# Patient Record
Sex: MALE | Race: WHITE | Employment: FULL TIME | URBAN - METROPOLITAN AREA
[De-identification: names, ages, dates, MRNs, and addresses within clinical notes are randomized per-mention and may not be internally consistent; named-entity substitution may affect disease eponyms.]

---

## 2017-10-27 ENCOUNTER — OFFICE VISIT (OUTPATIENT)
Dept: FAMILY MEDICINE CLINIC | Age: 52
End: 2017-10-27
Payer: COMMERCIAL

## 2017-10-27 VITALS
HEART RATE: 70 BPM | SYSTOLIC BLOOD PRESSURE: 110 MMHG | WEIGHT: 231.4 LBS | RESPIRATION RATE: 14 BRPM | DIASTOLIC BLOOD PRESSURE: 80 MMHG

## 2017-10-27 DIAGNOSIS — Z13.220 LIPID SCREENING: ICD-10-CM

## 2017-10-27 DIAGNOSIS — R10.32 LEFT INGUINAL PAIN: ICD-10-CM

## 2017-10-27 DIAGNOSIS — Z87.19 S/P BILATERAL INGUINAL HERNIORRHAPHY: ICD-10-CM

## 2017-10-27 DIAGNOSIS — R22.2 MASS ON BACK: Primary | ICD-10-CM

## 2017-10-27 DIAGNOSIS — Z12.5 SCREENING PSA (PROSTATE SPECIFIC ANTIGEN): ICD-10-CM

## 2017-10-27 DIAGNOSIS — G89.29 CHRONIC HEEL PAIN, LEFT: ICD-10-CM

## 2017-10-27 DIAGNOSIS — M79.672 CHRONIC HEEL PAIN, LEFT: ICD-10-CM

## 2017-10-27 DIAGNOSIS — Z13.1 DIABETES MELLITUS SCREENING: ICD-10-CM

## 2017-10-27 DIAGNOSIS — Z98.890 S/P BILATERAL INGUINAL HERNIORRHAPHY: ICD-10-CM

## 2017-10-27 PROCEDURE — G8421 BMI NOT CALCULATED: HCPCS | Performed by: FAMILY MEDICINE

## 2017-10-27 PROCEDURE — 3017F COLORECTAL CA SCREEN DOC REV: CPT | Performed by: FAMILY MEDICINE

## 2017-10-27 PROCEDURE — G8484 FLU IMMUNIZE NO ADMIN: HCPCS | Performed by: FAMILY MEDICINE

## 2017-10-27 PROCEDURE — 1036F TOBACCO NON-USER: CPT | Performed by: FAMILY MEDICINE

## 2017-10-27 PROCEDURE — G8427 DOCREV CUR MEDS BY ELIG CLIN: HCPCS | Performed by: FAMILY MEDICINE

## 2017-10-27 PROCEDURE — 99203 OFFICE O/P NEW LOW 30 MIN: CPT | Performed by: FAMILY MEDICINE

## 2017-10-27 RX ORDER — NAPROXEN 500 MG/1
500 TABLET ORAL 2 TIMES DAILY PRN
Qty: 20 TABLET | Refills: 0 | Status: SHIPPED | OUTPATIENT
Start: 2017-10-27 | End: 2018-01-11 | Stop reason: ALTCHOICE

## 2017-10-27 ASSESSMENT — ENCOUNTER SYMPTOMS
SHORTNESS OF BREATH: 0
ABDOMINAL PAIN: 0
BLOOD IN STOOL: 0
CHEST TIGHTNESS: 0

## 2017-10-27 ASSESSMENT — PATIENT HEALTH QUESTIONNAIRE - PHQ9
SUM OF ALL RESPONSES TO PHQ9 QUESTIONS 1 & 2: 0
2. FEELING DOWN, DEPRESSED OR HOPELESS: 0
1. LITTLE INTEREST OR PLEASURE IN DOING THINGS: 0
SUM OF ALL RESPONSES TO PHQ QUESTIONS 1-9: 0

## 2017-10-27 NOTE — PROGRESS NOTES
Subjective:      Patient ID: Beatrice Ribeiro is a 45 y.o. male. Visit Information    Have you changed or started any medications since your last visit including any over-the-counter medicines, vitamins, or herbal medicines? no   Are you having any side effects from any of your medications? -  no  Have you stopped taking any of your medications? Is so, why? -  no    Have you seen any other physician or provider since your last visit? No  Have you had any other diagnostic tests since your last visit? No  Have you been seen in the emergency room and/or had an admission to a hospital since we last saw you? No  Have you had your routine dental cleaning in the past 6 months? no    Have you activated your [employer] account? If not, what are your barriers? No     Patient Care Team:  Nikki Frederick MD as PCP - General (Family Medicine)    Medical History Review  Past Medical, Family, and Social History reviewed and does contribute to the patient presenting condition    Health Maintenance   Topic Date Due    Hepatitis C screen  1965    HIV screen  04/30/1978    DTaP/Tdap/Td vaccine (1 - Tdap) 04/30/1982    Lipid screen  04/30/2003    Colon cancer screen colonoscopy  04/30/2013    Flu vaccine (1) 09/01/2017     HPI  Patient is a 77-year-old white male who presents for initial office visit with complaint of masses on right upper back, left heel pain for over 6 months and left inguinal pain. Patient states he had bilateral inguinal herniorrhaphy in 2005. He also states he has a family history of colon cancer and would like to have a screening colonoscopy. He is currently not taking any medication. He denies any chest pain, shortness of breath, fever or chills. Review of Systems   Constitutional: Negative for chills and fever. HENT: Negative for congestion. Respiratory: Negative for chest tightness and shortness of breath. Cardiovascular: Negative for chest pain.    Gastrointestinal: Negative for Glucose, random     Standing Status:   Future     Standing Expiration Date:   10/27/2018    Psa screening     Standing Status:   Future     Standing Expiration Date:   10/27/2018     Orders Placed This Encounter   Medications    naproxen (NAPROSYN) 500 MG tablet     Sig: Take 1 tablet by mouth 2 times daily as needed for Pain (Take with food)     Dispense:  20 tablet     Refill:  0     Patient referred to podiatry for his heel pain  Patient referred to general surgery for excision of masses on back, left inguinal pain, colonoscopy.   Follow-up in 6 months or sooner if needed

## 2017-10-30 ENCOUNTER — HOSPITAL ENCOUNTER (OUTPATIENT)
Age: 52
Discharge: HOME | End: 2017-10-30
Payer: COMMERCIAL

## 2017-10-30 DIAGNOSIS — Z13.1 DIABETES MELLITUS SCREENING: ICD-10-CM

## 2017-10-30 DIAGNOSIS — Z12.5 SCREENING PSA (PROSTATE SPECIFIC ANTIGEN): ICD-10-CM

## 2017-10-30 DIAGNOSIS — E78.5 HYPERLIPIDEMIA, UNSPECIFIED HYPERLIPIDEMIA TYPE: Primary | ICD-10-CM

## 2017-10-30 DIAGNOSIS — Z13.220 LIPID SCREENING: ICD-10-CM

## 2017-10-30 LAB
CHOLESTEROL/HDL RATIO: 5.2
CHOLESTEROL: 256 MG/DL
GLUCOSE BLD-MCNC: 108 MG/DL (ref 70–99)
HDLC SERPL-MCNC: 49 MG/DL
LDL CHOLESTEROL: 190 MG/DL (ref 0–130)
PROSTATE SPECIFIC ANTIGEN: 0.46 UG/L
TRIGL SERPL-MCNC: 87 MG/DL
VLDLC SERPL CALC-MCNC: ABNORMAL MG/DL (ref 1–30)

## 2017-10-30 PROCEDURE — G0103 PSA SCREENING: HCPCS

## 2017-10-30 PROCEDURE — 82947 ASSAY GLUCOSE BLOOD QUANT: CPT

## 2017-10-30 PROCEDURE — 36415 COLL VENOUS BLD VENIPUNCTURE: CPT

## 2017-10-30 PROCEDURE — 80061 LIPID PANEL: CPT

## 2017-10-30 RX ORDER — ROSUVASTATIN CALCIUM 10 MG/1
10 TABLET, COATED ORAL DAILY
Qty: 30 TABLET | Refills: 3 | Status: SHIPPED | OUTPATIENT
Start: 2017-10-30 | End: 2018-03-12 | Stop reason: SDUPTHER

## 2017-11-24 ENCOUNTER — TELEPHONE (OUTPATIENT)
Dept: FAMILY MEDICINE CLINIC | Age: 52
End: 2017-11-24

## 2017-11-30 ENCOUNTER — HOSPITAL ENCOUNTER (OUTPATIENT)
Dept: ULTRASOUND IMAGING | Age: 52
Discharge: HOME | End: 2017-11-30
Payer: COMMERCIAL

## 2017-11-30 DIAGNOSIS — R19.09 GROIN LUMP: ICD-10-CM

## 2017-11-30 PROCEDURE — 76882 US LMTD JT/FCL EVL NVASC XTR: CPT

## 2017-12-01 ENCOUNTER — ANESTHESIA (OUTPATIENT)
Dept: OPERATING ROOM | Age: 52
End: 2017-12-01
Payer: COMMERCIAL

## 2017-12-01 ENCOUNTER — ANESTHESIA EVENT (OUTPATIENT)
Dept: OPERATING ROOM | Age: 52
End: 2017-12-01
Payer: COMMERCIAL

## 2017-12-01 ENCOUNTER — HOSPITAL ENCOUNTER (OUTPATIENT)
Age: 52
Setting detail: OUTPATIENT SURGERY
Discharge: HOME | End: 2017-12-01
Attending: SURGERY | Admitting: SURGERY
Payer: COMMERCIAL

## 2017-12-01 VITALS
SYSTOLIC BLOOD PRESSURE: 137 MMHG | OXYGEN SATURATION: 97 % | TEMPERATURE: 97 F | WEIGHT: 225 LBS | BODY MASS INDEX: 27.98 KG/M2 | RESPIRATION RATE: 14 BRPM | HEART RATE: 60 BPM | HEIGHT: 75 IN | DIASTOLIC BLOOD PRESSURE: 85 MMHG

## 2017-12-01 VITALS
SYSTOLIC BLOOD PRESSURE: 112 MMHG | OXYGEN SATURATION: 100 % | RESPIRATION RATE: 1 BRPM | DIASTOLIC BLOOD PRESSURE: 71 MMHG

## 2017-12-01 PROCEDURE — 7100000001 HC PACU RECOVERY - ADDTL 15 MIN: Performed by: SURGERY

## 2017-12-01 PROCEDURE — 6360000002 HC RX W HCPCS: Performed by: NURSE ANESTHETIST, CERTIFIED REGISTERED

## 2017-12-01 PROCEDURE — 7100000010 HC PHASE II RECOVERY - FIRST 15 MIN: Performed by: SURGERY

## 2017-12-01 PROCEDURE — 6360000002 HC RX W HCPCS: Performed by: SURGERY

## 2017-12-01 PROCEDURE — 2500000003 HC RX 250 WO HCPCS: Performed by: SURGERY

## 2017-12-01 PROCEDURE — 3600000002 HC SURGERY LEVEL 2 BASE: Performed by: SURGERY

## 2017-12-01 PROCEDURE — 88304 TISSUE EXAM BY PATHOLOGIST: CPT

## 2017-12-01 PROCEDURE — 2500000003 HC RX 250 WO HCPCS: Performed by: NURSE ANESTHETIST, CERTIFIED REGISTERED

## 2017-12-01 PROCEDURE — 6360000002 HC RX W HCPCS: Performed by: ANESTHESIOLOGY

## 2017-12-01 PROCEDURE — 7100000011 HC PHASE II RECOVERY - ADDTL 15 MIN: Performed by: SURGERY

## 2017-12-01 PROCEDURE — 2580000003 HC RX 258: Performed by: SURGERY

## 2017-12-01 PROCEDURE — 3700000000 HC ANESTHESIA ATTENDED CARE: Performed by: SURGERY

## 2017-12-01 PROCEDURE — 7100000000 HC PACU RECOVERY - FIRST 15 MIN: Performed by: SURGERY

## 2017-12-01 PROCEDURE — A6258 TRANSPARENT FILM >16<=48 IN: HCPCS | Performed by: SURGERY

## 2017-12-01 PROCEDURE — 3600000012 HC SURGERY LEVEL 2 ADDTL 15MIN: Performed by: SURGERY

## 2017-12-01 PROCEDURE — 3700000001 HC ADD 15 MINUTES (ANESTHESIA): Performed by: SURGERY

## 2017-12-01 PROCEDURE — 2580000003 HC RX 258: Performed by: NURSE ANESTHETIST, CERTIFIED REGISTERED

## 2017-12-01 PROCEDURE — 7100000031 HC ASPR PHASE II RECOVERY - ADDTL 15 MIN: Performed by: SURGERY

## 2017-12-01 PROCEDURE — 7100000030 HC ASPR PHASE II RECOVERY - FIRST 15 MIN: Performed by: SURGERY

## 2017-12-01 PROCEDURE — A6252 ABSORPT DRG >16 <=48 W/O BDR: HCPCS | Performed by: SURGERY

## 2017-12-01 RX ORDER — FENTANYL CITRATE 50 UG/ML
INJECTION, SOLUTION INTRAMUSCULAR; INTRAVENOUS PRN
Status: DISCONTINUED | OUTPATIENT
Start: 2017-12-01 | End: 2017-12-01 | Stop reason: SDUPTHER

## 2017-12-01 RX ORDER — PROPOFOL 10 MG/ML
INJECTION, EMULSION INTRAVENOUS PRN
Status: DISCONTINUED | OUTPATIENT
Start: 2017-12-01 | End: 2017-12-01 | Stop reason: SDUPTHER

## 2017-12-01 RX ORDER — ONDANSETRON 4 MG/1
TABLET, FILM COATED ORAL
Qty: 20 TABLET | Refills: 0 | Status: SHIPPED | OUTPATIENT
Start: 2017-12-01 | End: 2018-01-11 | Stop reason: ALTCHOICE

## 2017-12-01 RX ORDER — OXYCODONE HYDROCHLORIDE AND ACETAMINOPHEN 5; 325 MG/1; MG/1
1 TABLET ORAL
Status: DISCONTINUED | OUTPATIENT
Start: 2017-12-01 | End: 2017-12-01 | Stop reason: HOSPADM

## 2017-12-01 RX ORDER — SODIUM CHLORIDE, SODIUM LACTATE, POTASSIUM CHLORIDE, CALCIUM CHLORIDE 600; 310; 30; 20 MG/100ML; MG/100ML; MG/100ML; MG/100ML
INJECTION, SOLUTION INTRAVENOUS CONTINUOUS PRN
Status: DISCONTINUED | OUTPATIENT
Start: 2017-12-01 | End: 2017-12-01 | Stop reason: SDUPTHER

## 2017-12-01 RX ORDER — EPHEDRINE SULFATE 50 MG/ML
INJECTION, SOLUTION INTRAVENOUS PRN
Status: DISCONTINUED | OUTPATIENT
Start: 2017-12-01 | End: 2017-12-01 | Stop reason: SDUPTHER

## 2017-12-01 RX ORDER — ONDANSETRON 2 MG/ML
4 INJECTION INTRAMUSCULAR; INTRAVENOUS
Status: DISCONTINUED | OUTPATIENT
Start: 2017-12-01 | End: 2017-12-01 | Stop reason: HOSPADM

## 2017-12-01 RX ORDER — MIDAZOLAM HYDROCHLORIDE 1 MG/ML
INJECTION INTRAMUSCULAR; INTRAVENOUS PRN
Status: DISCONTINUED | OUTPATIENT
Start: 2017-12-01 | End: 2017-12-01 | Stop reason: SDUPTHER

## 2017-12-01 RX ORDER — CEPHALEXIN 500 MG/1
CAPSULE ORAL
Qty: 21 CAPSULE | Refills: 0 | Status: SHIPPED | OUTPATIENT
Start: 2017-12-01 | End: 2018-01-11 | Stop reason: ALTCHOICE

## 2017-12-01 RX ORDER — SODIUM CHLORIDE, SODIUM LACTATE, POTASSIUM CHLORIDE, CALCIUM CHLORIDE 600; 310; 30; 20 MG/100ML; MG/100ML; MG/100ML; MG/100ML
INJECTION, SOLUTION INTRAVENOUS CONTINUOUS
Status: DISCONTINUED | OUTPATIENT
Start: 2017-12-01 | End: 2017-12-01 | Stop reason: HOSPADM

## 2017-12-01 RX ORDER — DIPHENHYDRAMINE HYDROCHLORIDE 50 MG/ML
12.5 INJECTION INTRAMUSCULAR; INTRAVENOUS
Status: DISCONTINUED | OUTPATIENT
Start: 2017-12-01 | End: 2017-12-01 | Stop reason: HOSPADM

## 2017-12-01 RX ORDER — MORPHINE SULFATE 10 MG/ML
2 INJECTION, SOLUTION INTRAMUSCULAR; INTRAVENOUS EVERY 5 MIN PRN
Status: DISCONTINUED | OUTPATIENT
Start: 2017-12-01 | End: 2017-12-01 | Stop reason: HOSPADM

## 2017-12-01 RX ORDER — OXYCODONE HYDROCHLORIDE AND ACETAMINOPHEN 5; 325 MG/1; MG/1
TABLET ORAL
Qty: 30 TABLET | Refills: 0 | Status: SHIPPED | OUTPATIENT
Start: 2017-12-01 | End: 2018-02-08 | Stop reason: ALTCHOICE

## 2017-12-01 RX ORDER — LIDOCAINE HYDROCHLORIDE 10 MG/ML
INJECTION, SOLUTION EPIDURAL; INFILTRATION; INTRACAUDAL; PERINEURAL PRN
Status: DISCONTINUED | OUTPATIENT
Start: 2017-12-01 | End: 2017-12-01 | Stop reason: SDUPTHER

## 2017-12-01 RX ORDER — BUPIVACAINE HYDROCHLORIDE 5 MG/ML
INJECTION, SOLUTION EPIDURAL; INTRACAUDAL PRN
Status: DISCONTINUED | OUTPATIENT
Start: 2017-12-01 | End: 2017-12-01 | Stop reason: HOSPADM

## 2017-12-01 RX ORDER — HYDROMORPHONE HCL 110MG/55ML
0.5 PATIENT CONTROLLED ANALGESIA SYRINGE INTRAVENOUS EVERY 5 MIN PRN
Status: DISCONTINUED | OUTPATIENT
Start: 2017-12-01 | End: 2017-12-01 | Stop reason: HOSPADM

## 2017-12-01 RX ADMIN — SODIUM CHLORIDE, POTASSIUM CHLORIDE, SODIUM LACTATE AND CALCIUM CHLORIDE: 600; 310; 30; 20 INJECTION, SOLUTION INTRAVENOUS at 12:34

## 2017-12-01 RX ADMIN — LIDOCAINE HYDROCHLORIDE 50 MG: 10 INJECTION, SOLUTION EPIDURAL; INFILTRATION; INTRACAUDAL; PERINEURAL at 14:38

## 2017-12-01 RX ADMIN — FENTANYL CITRATE 50 MCG: 50 INJECTION, SOLUTION INTRAMUSCULAR; INTRAVENOUS at 15:20

## 2017-12-01 RX ADMIN — HYDROMORPHONE HYDROCHLORIDE 0.5 MG: 2 INJECTION, SOLUTION INTRAMUSCULAR; INTRAVENOUS; SUBCUTANEOUS at 16:28

## 2017-12-01 RX ADMIN — PROPOFOL 200 MG: 10 INJECTION, EMULSION INTRAVENOUS at 14:38

## 2017-12-01 RX ADMIN — HYDROMORPHONE HYDROCHLORIDE 0.5 MG: 2 INJECTION, SOLUTION INTRAMUSCULAR; INTRAVENOUS; SUBCUTANEOUS at 16:20

## 2017-12-01 RX ADMIN — FENTANYL CITRATE 50 MCG: 50 INJECTION, SOLUTION INTRAMUSCULAR; INTRAVENOUS at 14:44

## 2017-12-01 RX ADMIN — FENTANYL CITRATE 50 MCG: 50 INJECTION, SOLUTION INTRAMUSCULAR; INTRAVENOUS at 14:38

## 2017-12-01 RX ADMIN — SODIUM CHLORIDE, POTASSIUM CHLORIDE, SODIUM LACTATE AND CALCIUM CHLORIDE: 600; 310; 30; 20 INJECTION, SOLUTION INTRAVENOUS at 14:35

## 2017-12-01 RX ADMIN — FENTANYL CITRATE 50 MCG: 50 INJECTION, SOLUTION INTRAMUSCULAR; INTRAVENOUS at 15:00

## 2017-12-01 RX ADMIN — EPHEDRINE SULFATE 10 MG: 50 INJECTION INTRAMUSCULAR; INTRAVENOUS; SUBCUTANEOUS at 14:52

## 2017-12-01 RX ADMIN — MIDAZOLAM 2 MG: 1 INJECTION INTRAMUSCULAR; INTRAVENOUS at 14:35

## 2017-12-01 RX ADMIN — CEFAZOLIN 2 G: 1 INJECTION, POWDER, FOR SOLUTION INTRAMUSCULAR; INTRAVENOUS at 14:50

## 2017-12-01 ASSESSMENT — PULMONARY FUNCTION TESTS
PIF_VALUE: 14
PIF_VALUE: 2
PIF_VALUE: 16
PIF_VALUE: 14
PIF_VALUE: 13
PIF_VALUE: 15
PIF_VALUE: 1
PIF_VALUE: 12
PIF_VALUE: 12
PIF_VALUE: 9
PIF_VALUE: 14
PIF_VALUE: 12
PIF_VALUE: 18
PIF_VALUE: 16
PIF_VALUE: 12
PIF_VALUE: 13
PIF_VALUE: 13
PIF_VALUE: 25
PIF_VALUE: 14
PIF_VALUE: 1
PIF_VALUE: 16
PIF_VALUE: 18
PIF_VALUE: 21
PIF_VALUE: 13
PIF_VALUE: 12
PIF_VALUE: 4
PIF_VALUE: 14
PIF_VALUE: 13
PIF_VALUE: 1
PIF_VALUE: 2
PIF_VALUE: 12
PIF_VALUE: 16
PIF_VALUE: 14
PIF_VALUE: 15
PIF_VALUE: 13
PIF_VALUE: 14
PIF_VALUE: 16
PIF_VALUE: 14
PIF_VALUE: 1
PIF_VALUE: 13
PIF_VALUE: 14

## 2017-12-01 ASSESSMENT — PAIN SCALES - GENERAL
PAINLEVEL_OUTOF10: 0
PAINLEVEL_OUTOF10: 0
PAINLEVEL_OUTOF10: 5
PAINLEVEL_OUTOF10: 3
PAINLEVEL_OUTOF10: 5

## 2017-12-01 ASSESSMENT — PAIN DESCRIPTION - LOCATION: LOCATION: BACK

## 2017-12-01 ASSESSMENT — ENCOUNTER SYMPTOMS: STRIDOR: 0

## 2017-12-01 ASSESSMENT — PAIN - FUNCTIONAL ASSESSMENT: PAIN_FUNCTIONAL_ASSESSMENT: 0-10

## 2017-12-01 ASSESSMENT — PAIN DESCRIPTION - PAIN TYPE: TYPE: CHRONIC PAIN

## 2017-12-01 NOTE — H&P
28.12 kg/m². GENERAL APPEARANCE:   Nicholas Ivey is 45 y.o.,  male, mildly obese, nourished, conscious, alert. Does not appear to be distress or pain at this time. SKIN:  Warm, dry, no cyanosis or jaundice. 3 cysts on the back, the largest is about 3-4 cm in size and in the right upper back no redness ulceration or rashes seen patient has another cystic lesion about 1 cm in the mid back and a small half centimeter cystic lesion on the left shoulder posteriorly. HEAD:  Normocephalic, atraumatic, no swelling or tenderness. EYES:  Pupils equal, reactive to light. EARS:  No discharge, no marked hearing loss. NOSE:  No rhinorrhea, epistaxis or septal deformity. THROAT:  Not congested. No ulceration bleeding or discharge. NECK:  No stiffness, trachea central.  No palpable masses or L.N.                 CHEST:  Symmetrical and equal on expansion. HEART:  RRR S1 > S2. No audible murmurs or gallops. LUNGS:  Equal on expansion, normal breath sounds. No adventitious sounds. ABDOMEN:  Obese. Soft on palpation. No localized tenderness. No guarding or rigidity. No palpable organomegaly. LYMPHATICS:  No palpable cervical lymphadenopathy. LOCOMOTOR, BACK AND SPINE:  No tenderness or deformities. EXTREMITIES:  Symmetrical, no pedal edema. Lakishas sign negative. No discoloration or ulcerations. NEUROLOGIC:  The patient is conscious, alert, oriented, No apparent focal sensory or motor deficits. PROVISIONAL DIAGNOSES / SURGERY:      Back lesion, Excision.     Patient Active Problem List    Diagnosis Date Noted    Hyperlipidemia 10/30/2017    S/P bilateral inguinal herniorrhaphy 10/27/2017           KISHA SHEPPARD PA-C on 12/1/2017 at 12:33 PM

## 2017-12-01 NOTE — BRIEF OP NOTE
Brief Postoperative Note  ______________________________________________________________    Patient: Abby Shaw  YOB: 1965  MRN: [id number]  Date of Procedure: 12/1/2017    Pre-Op Diagnosis: UPPER BACK LESION  (PAT ON ADMIT OFFICE TO FAX ORDERS)    Post-Op Diagnosis: Same       Procedure(s):  Excision cyst 6*4*2 cm right scapular/ excision cyst upper mid back 4*2.5*1.5 cm    Anesthesia: [unfilled]    Surgeon(s):  Cynthia Jessica MD    Staff:  Scrub Person First: Krystal De Guzman     Estimated Blood Loss: * No values recorded between 12/1/2017  2:33 PM and 12/1/2017  6:10 PM * mL    Complications: None    Specimens:   ID Type Source Tests Collected by Time Destination   A : right scapula Tissue Joint, Shoulder SURGICAL PATHOLOGY Cynthia Jessica MD 12/1/2017 1459    B : upper midline Tissue Back SURGICAL PATHOLOGY Cynthia Jessica MD 12/1/2017 1503        Implants:  * No implants in log *      Drains:      Findings: dictated    Cynthia Jessica MD  Date: 12/1/2017  Time: 3:19 PM

## 2017-12-01 NOTE — ANESTHESIA PRE PROCEDURE
Department of Anesthesiology  Preprocedure Note       Name:  Yanna Bacon   Age:  45 y.o.  :  1965                                          MRN:  [id number]         Date:  2017      Surgeon: Don Gonzalez):  Berkley Carlton MD    Procedure: Procedure(s):  BACK LESION EXCISION UPPER    Medications prior to admission:   Prior to Admission medications    Medication Sig Start Date End Date Taking? Authorizing Provider   rosuvastatin (CRESTOR) 10 MG tablet Take 1 tablet by mouth daily 10/30/17  Yes Juan Mehta MD   naproxen (NAPROSYN) 500 MG tablet Take 1 tablet by mouth 2 times daily as needed for Pain (Take with food) 10/27/17  Yes Juan Mehta MD       Current medications:    Current Facility-Administered Medications   Medication Dose Route Frequency Provider Last Rate Last Dose    lactated ringers infusion   Intravenous Continuous Berkley Carlton  mL/hr at 17 1234      ceFAZolin (ANCEF) 2 g in sterile water 20 mL IV syringe  2 g Intravenous Once Berkley Carlton MD           Allergies:  No Known Allergies    Problem List:    Patient Active Problem List   Diagnosis Code    S/P bilateral inguinal herniorrhaphy Z98.890, Z87.19    Hyperlipidemia E78.5       Past Medical History:  No past medical history on file.     Past Surgical History:        Procedure Laterality Date    ELBOW SURGERY Right     HERNIA REPAIR  2006    SKIN GRAFT      finger    TONSILLECTOMY      VEIN SURGERY Bilateral     legs       Social History:    Social History   Substance Use Topics    Smoking status: Former Smoker    Smokeless tobacco: Never Used    Alcohol use Yes                                Counseling given: Not Answered      Vital Signs (Current):   Vitals:    17 1200 17 1203   BP:  127/79   Pulse:  60   Resp:  15   Temp:  97.5 °F (36.4 °C)   TempSrc: Oral Oral   SpO2:  99%   Weight: 231 lb (104.8 kg) 225 lb (102.1 kg)   Height:  6' 3\" (1.905 m) BP Readings from Last 3 Encounters:   12/01/17 127/79   10/27/17 110/80       NPO Status: Time of last liquid consumption: 1900                        Time of last solid consumption: 1900                        Date of last liquid consumption: 11/30/17                        Date of last solid food consumption: 11/30/17    BMI:   Wt Readings from Last 3 Encounters:   12/01/17 225 lb (102.1 kg)   10/27/17 231 lb 6.4 oz (105 kg)     Body mass index is 28.12 kg/m². CBC: No results found for: WBC, RBC, HGB, HCT, MCV, RDW, PLT    CMP:   Lab Results   Component Value Date    GLUCOSE 108 10/30/2017       POC Tests: No results for input(s): POCGLU, POCNA, POCK, POCCL, POCBUN, POCHEMO, POCHCT in the last 72 hours. Coags: No results found for: PROTIME, INR, APTT    HCG (If Applicable): No results found for: PREGTESTUR, PREGSERUM, HCG, HCGQUANT     ABGs: No results found for: PHART, PO2ART, SXE8YJL, KMN0QXP, BEART, G3MRTYHS     Type & Screen (If Applicable):  No results found for: LABABO, [address]    Anesthesia Evaluation  Patient summary reviewed  Airway: Mallampati: I  TM distance: >3 FB   Neck ROM: full  Mouth opening: > = 3 FB Dental:          Pulmonary:Negative Pulmonary ROS       (-) rhonchi, wheezes, rales and stridor                           Cardiovascular:Negative CV ROS        (-) murmur,  friction rub, carotid bruit and peripheral edema                Neuro/Psych:   Negative Neuro/Psych ROS              GI/Hepatic/Renal: Neg GI/Hepatic/Renal ROS            Endo/Other: Negative Endo/Other ROS                    Abdominal:           Vascular: negative vascular ROS. Anesthesia Plan      general     ASA 1       Induction: intravenous. MIPS: Postoperative opioids intended and Prophylactic antiemetics administered. Anesthetic plan and risks discussed with patient. Plan discussed with CRNA.                   Ryan Jerez MD   12/1/2017

## 2017-12-01 NOTE — ANESTHESIA POSTPROCEDURE EVALUATION
Department of Anesthesiology  Postprocedure Note    Patient: Tre Rogers  MRN: [id number]  YOB: 1965  Date of evaluation: 12/1/2017  Time:  5:24 PM     Procedure Summary     Date:  12/01/17 Room / Location:  [address] / [address]    Anesthesia Start:  1435 Anesthesia Stop:  4640    Procedure:  BACK LESION EXCISION UPPER x2 (N/A ) Diagnosis:  (UPPER BACK LESION  (PAT ON ADMIT OFFICE TO [facility]))    Surgeon:  Jose Roberto Guillaume MD Responsible Provider:  George Jimenes MD    Anesthesia Type:  general ASA Status:  1          Anesthesia Type: general    Taylor Phase I: Taylor Score: 10    Taylor Phase II:      Last vitals: Reviewed and per EMR flowsheets.        Anesthesia Post Evaluation    Patient location during evaluation: PACU  Patient participation: complete - patient participated  Level of consciousness: awake and alert  Airway patency: patent  Nausea & Vomiting: no vomiting  Complications: no  Cardiovascular status: hemodynamically stable  Respiratory status: acceptable  Hydration status: euvolemic

## 2017-12-05 LAB — SURGICAL PATHOLOGY REPORT: NORMAL

## 2017-12-07 ENCOUNTER — OFFICE VISIT (OUTPATIENT)
Dept: PODIATRY | Age: 52
End: 2017-12-07
Payer: COMMERCIAL

## 2017-12-07 VITALS
WEIGHT: 225 LBS | DIASTOLIC BLOOD PRESSURE: 87 MMHG | SYSTOLIC BLOOD PRESSURE: 123 MMHG | BODY MASS INDEX: 27.98 KG/M2 | HEIGHT: 75 IN | HEART RATE: 68 BPM

## 2017-12-07 DIAGNOSIS — M79.672 PAIN IN LEFT FOOT: ICD-10-CM

## 2017-12-07 DIAGNOSIS — M72.2 PLANTAR FASCIITIS OF LEFT FOOT: Primary | ICD-10-CM

## 2017-12-07 PROCEDURE — 1036F TOBACCO NON-USER: CPT | Performed by: PODIATRIST

## 2017-12-07 PROCEDURE — L3020 FOOT LONGITUD/METATARSAL SUP: HCPCS | Performed by: PODIATRIST

## 2017-12-07 PROCEDURE — G8427 DOCREV CUR MEDS BY ELIG CLIN: HCPCS | Performed by: PODIATRIST

## 2017-12-07 PROCEDURE — 99203 OFFICE O/P NEW LOW 30 MIN: CPT | Performed by: PODIATRIST

## 2017-12-07 PROCEDURE — 3017F COLORECTAL CA SCREEN DOC REV: CPT | Performed by: PODIATRIST

## 2017-12-07 PROCEDURE — G8484 FLU IMMUNIZE NO ADMIN: HCPCS | Performed by: PODIATRIST

## 2017-12-07 PROCEDURE — G8419 CALC BMI OUT NRM PARAM NOF/U: HCPCS | Performed by: PODIATRIST

## 2017-12-07 ASSESSMENT — ENCOUNTER SYMPTOMS
SHORTNESS OF BREATH: 0
NAUSEA: 0
BACK PAIN: 0
DIARRHEA: 0
COLOR CHANGE: 0

## 2017-12-07 NOTE — PROGRESS NOTES
Misty Shrestha is a 45 y.o. male who presents to the office today with chief complaint of pain to the left heel. Chief Complaint   Patient presents with    Foot Pain     left foot pain X2 months   Symptoms began about 2 month(s) ago. Patient denies injury to the left foot. Patient states that the pain is when getting out of bed in the morning and after being on his feet all day. Pain is rated 9 out of 10 at it's worst and is described as intermittent. Treatments prior to today's visit include: None. No Known Allergies    No past medical history on file. Prior to Admission medications    Medication Sig Start Date End Date Taking? Authorizing Provider   cephALEXin (KEFLEX) 500 MG capsule 500 mgTake three times daily 12/1/17  Yes Donna Julian MD   rosuvastatin (CRESTOR) 10 MG tablet Take 1 tablet by mouth daily 10/30/17  Yes Seng Ruiz MD   naproxen (NAPROSYN) 500 MG tablet Take 1 tablet by mouth 2 times daily as needed for Pain (Take with food) 10/27/17  Yes Seng Ruiz MD   oxyCODONE-acetaminophen (PERCOCET) 5-325 MG per tablet Take 1-2 tabs every 4 hours as needed for pain. 12/1/17   Donna Julian MD   ondansetron (ZOFRAN) 4 MG tablet Take every six hours as needed 12/1/17   Donna Julian MD       Past Surgical History:   Procedure Laterality Date    ELBOW SURGERY Right     HERNIA REPAIR  2006    AK EXCISION TUMOR SOFT TISSUE BACK/FLANK SUBQ <3CM N/A 12/1/2017    BACK LESION EXCISION UPPER x2 [facility].      finger    TONSILLECTOMY      VEIN SURGERY Bilateral     legs       Family History   Problem Relation Age of Onset    Cancer Mother      colon cancer       Social History   Substance Use Topics    Smoking status: Former Smoker    Smokeless tobacco: Never Used    Alcohol use Yes       Review of Systems   Constitutional: Negative for activity change, appetite change, chills, diaphoresis, fatigue and fever.    Respiratory: Negative for shortness of breath. Cardiovascular: Negative for leg swelling. Gastrointestinal: Negative for diarrhea and nausea. Endocrine: Negative for cold intolerance, heat intolerance and polyuria. Musculoskeletal: Positive for arthralgias. Negative for back pain, gait problem, joint swelling and myalgias. Skin: Negative for color change, pallor, rash and wound. Allergic/Immunologic: Negative for environmental allergies and food allergies. Neurological: Negative for dizziness, weakness, light-headedness and numbness. Hematological: Does not bruise/bleed easily. Psychiatric/Behavioral: Negative for behavioral problems, confusion and self-injury. The patient is not nervous/anxious. Vitals:   Vitals:    12/07/17 1523   BP: 123/87   Pulse: 68       General: AAO x 3 in NAD. Integument: There are no rashes, ulcers, or breaks in the skin noted to the bilateral lower extremities. There is no induration, subcutaneous nodules, or tightening of the skin noted to the bilateral.     Toenails 1-5 of the right foot do not present with thickness, elongation, discoloration, brittleness, subungual debris. Toenails 1-5 of the left foot do not present with thickness, elongation, discoloration, brittleness, subungual debris. Interdigital maceration absent to web spaces 1-4, Bilateral.     There are no preulcerative lesions noted to the right foot. There are no preulcerative lesions noted to the left foot. The skin to the bilateral feet is not thin and shiny. The skin to the bilateral feet is  warm, supple, and dry. Vascular: DP pulse of the right foot is  palpable. DP pulse of the left foot is  palpable. PT pulse of the right foot is  palpable. PT pulse of the left foot is  palpable. CFT is less than 3 secs to the digits of the right foot. CFT is less than 3 secs to the digits of the left foot. There is no edema noted to the bilateral foot or ankle.

## 2017-12-15 ENCOUNTER — HOSPITAL ENCOUNTER (OUTPATIENT)
Age: 52
Setting detail: OUTPATIENT SURGERY
Discharge: HOME | End: 2017-12-15
Attending: SURGERY | Admitting: SURGERY
Payer: COMMERCIAL

## 2017-12-15 VITALS
DIASTOLIC BLOOD PRESSURE: 78 MMHG | RESPIRATION RATE: 14 BRPM | WEIGHT: 225 LBS | BODY MASS INDEX: 27.98 KG/M2 | SYSTOLIC BLOOD PRESSURE: 117 MMHG | TEMPERATURE: 97.7 F | HEIGHT: 75 IN | HEART RATE: 55 BPM | OXYGEN SATURATION: 96 %

## 2017-12-15 DIAGNOSIS — K57.32 DIVERTICULITIS OF LARGE INTESTINE WITHOUT PERFORATION OR ABSCESS WITHOUT BLEEDING: Primary | ICD-10-CM

## 2017-12-15 PROCEDURE — A6252 ABSORPT DRG >16 <=48 W/O BDR: HCPCS | Performed by: SURGERY

## 2017-12-15 PROCEDURE — 3609027000 HC COLONOSCOPY: Performed by: SURGERY

## 2017-12-15 PROCEDURE — 99153 MOD SED SAME PHYS/QHP EA: CPT | Performed by: SURGERY

## 2017-12-15 PROCEDURE — 7100000011 HC PHASE II RECOVERY - ADDTL 15 MIN: Performed by: SURGERY

## 2017-12-15 PROCEDURE — 6360000002 HC RX W HCPCS: Performed by: SURGERY

## 2017-12-15 PROCEDURE — A6258 TRANSPARENT FILM >16<=48 IN: HCPCS | Performed by: SURGERY

## 2017-12-15 PROCEDURE — 7100000010 HC PHASE II RECOVERY - FIRST 15 MIN: Performed by: SURGERY

## 2017-12-15 PROCEDURE — 99152 MOD SED SAME PHYS/QHP 5/>YRS: CPT | Performed by: SURGERY

## 2017-12-15 PROCEDURE — 2580000003 HC RX 258: Performed by: SURGERY

## 2017-12-15 RX ORDER — FENTANYL CITRATE 50 UG/ML
INJECTION, SOLUTION INTRAMUSCULAR; INTRAVENOUS PRN
Status: DISCONTINUED | OUTPATIENT
Start: 2017-12-15 | End: 2017-12-15 | Stop reason: HOSPADM

## 2017-12-15 RX ORDER — SULFAMETHOXAZOLE AND TRIMETHOPRIM 800; 160 MG/1; MG/1
1 TABLET ORAL 2 TIMES DAILY
Qty: 28 TABLET | Refills: 0 | Status: SHIPPED | OUTPATIENT
Start: 2017-12-15 | End: 2017-12-29

## 2017-12-15 RX ORDER — SODIUM CHLORIDE, SODIUM LACTATE, POTASSIUM CHLORIDE, CALCIUM CHLORIDE 600; 310; 30; 20 MG/100ML; MG/100ML; MG/100ML; MG/100ML
INJECTION, SOLUTION INTRAVENOUS CONTINUOUS
Status: DISCONTINUED | OUTPATIENT
Start: 2017-12-15 | End: 2017-12-15 | Stop reason: HOSPADM

## 2017-12-15 RX ORDER — MIDAZOLAM HYDROCHLORIDE 1 MG/ML
INJECTION INTRAMUSCULAR; INTRAVENOUS PRN
Status: DISCONTINUED | OUTPATIENT
Start: 2017-12-15 | End: 2017-12-15 | Stop reason: HOSPADM

## 2017-12-15 RX ADMIN — SODIUM CHLORIDE, POTASSIUM CHLORIDE, SODIUM LACTATE AND CALCIUM CHLORIDE: 600; 310; 30; 20 INJECTION, SOLUTION INTRAVENOUS at 13:30

## 2017-12-15 ASSESSMENT — PAIN - FUNCTIONAL ASSESSMENT: PAIN_FUNCTIONAL_ASSESSMENT: 0-10

## 2017-12-15 ASSESSMENT — PAIN SCALES - GENERAL: PAINLEVEL_OUTOF10: 0

## 2017-12-15 NOTE — OP NOTE
PROCEDURE NOTE    DATE OF PROCEDURE: 12/15/2017    SURGEON: Sherly Contreras MD    ASSISTANT: None    PREOPERATIVE DIAGNOSIS: Left lower quadrant abdominal pain    POSTOPERATIVE DIAGNOSIS: Sigmoid diverticulosis/grade 1 internal hemorrhoid/poor prep    OPERATION: Total colonoscopy to cecum    ANESTHESIA: Moderate Sedation    ESTIMATED BLOOD LOSS: None    COMPLICATIONS: None     SPECIMENS:  Was Not Obtained    HISTORY: The patient is a 45y.o. year old male with history of above preop diagnosis. I recommended colonoscopy with possible biopsy or polypectomy and I explained the risk, benefits, expected outcome, and alternatives to the procedure. Risks included but are not limited to bleeding, infection, respiratory distress, hypotension, and perforation of the colon and possibility of missing a lesion. The patient understands and is in agreement. PROCEDURE: The patient was given IV conscious sedation. The patient's SPO2 remained above 90% throughout the procedure. Digital rectal exam was normal.  The colonoscope was inserted through the anus into the rectum and advanced under direct vision to the cecum without difficulty. Terminal ileum was examined for approximately 2 inches. The prep was poor. yp  Findings:  Terminal ileum: not examined    Cecum/Ascending colon: normal except poor prep limiting visualization    Transverse colon: Grossly normal except poor prep limiting visualization    Descending/Sigmoid colon: abnormal: Sigmoid diverticulosis/poor prep limiting visualization    Rectum/Anus: examined in normal and retroflexed positions and was abnormal: Grade 1 internal hemorrhoid/stool in the rectal wall limiting visualization    Withdrawal Time was (minutes): 14      Next screening colonoscopy: 1 years. If screening is less than 10 years the recommended reason is due:poor preparation    The colon was decompressed and the scope was removed. The patient tolerated the procedure well. Recommendations/Plan:   1. F/U Biopsies  2. F/U In Office as instructed  3. Discussed with the family  4. High fiber diet   5. Precautions to avoid constipation  6. Given patient's history of left lower quadrant pain I will schedule him for CT scan of the abdomen and pelvis with contrast.  7. His incisions on the back where examined. Every other staple was removed. Mild erythema noted on one of the incision and we'll place the patient on a biotics for 10 days. 8. Outpatient follow-up in the office to discuss colonoscopy findings pathology results on the cyst excised on the back and wound evaluation in the next few days.   9. Outpatient CT scan of the abdomen and pelvis as mentioned above regarding left lower quadrant abdominal pain    Electronically signed by Sherly Contreras MD  on 12/15/2017 at 3:31 PM

## 2017-12-15 NOTE — H&P
HISTORY and [facility] [address]       NAME:  Clint Lei  MRN: [id number]   YOB: 1965   Date: 12/15/2017   Age: 45 y.o. Gender: male       COMPLAINT AND PRESENT HISTORY:   45 y o male with slight abdominal discomfort in left lower quadrant. No particular trigger. Discomfort relatively constant at 2 on scale. Denies constipation, diarrhea or black/bloody stool. Mother had colon cancer. Duration couple months. PAST MEDICAL HISTORY   History reviewed. No pertinent past medical history. Pt denies any history of Diabetes mellitus type 2, hypertension, stroke, heart disease, COPD, Asthma, GERD, HLD, Cancer, Seizures,Thyroid disease, Kidney Disease, Hepatitis, TB, Psychiatric Disorders or Substance abuse. SURGICAL HISTORY       Past Surgical History:   Procedure Laterality Date    ELBOW SURGERY Right     HERNIA REPAIR  2006    AL EXCISION TUMOR SOFT TISSUE BACK/FLANK SUBQ <3CM N/A 12/1/2017    BACK LESION EXCISION UPPER x2 performed [facility] Blanco [facility]cock [facility].      finger    TONSILLECTOMY      VEIN SURGERY Bilateral     legs       FAMILY HISTORY       Family History   Problem Relation Age of Onset    Cancer Mother      colon cancer       SOCIAL HISTORY       Social History     Social History    Marital status:      Spouse name: N/A    Number of children: N/A    Years of education: N/A     Social History Main Topics    Smoking status: Former Smoker    Smokeless tobacco: Never Used    Alcohol use Yes    Drug use: Unknown    Sexual activity: Not Asked     Other Topics Concern    None     Social History Narrative    None           REVIEW OF SYSTEMS      No Known Allergies    No current facility-administered medications on file prior to encounter.       Current Outpatient Prescriptions on File Prior to Encounter   Medication Sig Dispense Refill    oxyCODONE-acetaminophen (PERCOCET) 5-325 MG per tablet Take 1-2 tabs every 4 hours as needed for pain. 30 tablet 0    cephALEXin (KEFLEX) 500 MG capsule 500 mgTake three times daily 21 capsule 0    rosuvastatin (CRESTOR) 10 MG tablet Take 1 tablet by mouth daily 30 tablet 3    naproxen (NAPROSYN) 500 MG tablet Take 1 tablet by mouth 2 times daily as needed for Pain (Take with food) 20 tablet 0    ondansetron (ZOFRAN) 4 MG tablet Take every six hours as needed 20 tablet 0        General health:  Fair. No fever or chills. Skin:  No itching, redness or rash. HEENT:  No epistaxis or sore throat. Couple headaches. Neck:  No pain, stiffness or masses. Cardiovascular/Respiratory system:  No chest pain, palpitation or shortness of breath. Gastrointestinal tract: No abdominal pain, nausea, vomiting, diarrhea or constipation. Genitourinary:  No burning on micturition. No hesitancy, urgency, frequency or discoloration of urine. Locomotor:  No bone or joint pains. No swelling. Muscle/joint pain R/T work in construction. Neuropsychiatric:  No referable complaints. See HPI. GENERAL PHYSICAL EXAM:     Vitals: /72   Pulse 61   Temp 97.5 °F (36.4 °C) (Oral)   Resp 16   Ht 6' 3\" (1.905 m)   Wt 225 lb (102.1 kg)   SpO2 99%   BMI 28.12 kg/m²  Body mass index is 28.12 kg/m². GENERAL APPEARANCE:   Zuly Kemp is 45 y.o.,  male, not obese, nourished, conscious, alert. Does not appear to be distress or pain at this time. SKIN:  Warm, dry, no cyanosis or jaundice. HEAD:  Normocephalic, atraumatic, no swelling or tenderness. EYES:  Pupils equal, reactive to light. EARS:  No discharge, no marked hearing loss. NOSE:  No rhinorrhea, epistaxis or septal deformity. THROAT:  Not congested. No ulceration bleeding or discharge.                   NECK:  No

## 2017-12-22 ENCOUNTER — HOSPITAL ENCOUNTER (OUTPATIENT)
Dept: CT IMAGING | Age: 52
Discharge: HOME | End: 2017-12-22
Payer: COMMERCIAL

## 2017-12-22 DIAGNOSIS — K57.32 DIVERTICULITIS OF LARGE INTESTINE WITHOUT PERFORATION OR ABSCESS WITHOUT BLEEDING: ICD-10-CM

## 2017-12-22 PROCEDURE — 74177 CT ABD & PELVIS W/CONTRAST: CPT

## 2017-12-22 PROCEDURE — 2580000003 HC RX 258: Performed by: SURGERY

## 2017-12-22 PROCEDURE — 6360000004 HC RX CONTRAST MEDICATION: Performed by: SURGERY

## 2017-12-22 RX ORDER — 0.9 % SODIUM CHLORIDE 0.9 %
100 INTRAVENOUS SOLUTION INTRAVENOUS ONCE
Status: COMPLETED | OUTPATIENT
Start: 2017-12-22 | End: 2017-12-22

## 2017-12-22 RX ORDER — SODIUM CHLORIDE 0.9 % (FLUSH) 0.9 %
10 SYRINGE (ML) INJECTION PRN
Status: DISCONTINUED | OUTPATIENT
Start: 2017-12-22 | End: 2017-12-25 | Stop reason: HOSPADM

## 2017-12-22 RX ADMIN — IOHEXOL 50 ML: 240 INJECTION, SOLUTION INTRATHECAL; INTRAVASCULAR; INTRAVENOUS; ORAL at 09:50

## 2017-12-22 RX ADMIN — Medication 10 ML: at 09:50

## 2017-12-22 RX ADMIN — SODIUM CHLORIDE 100 ML: 9 INJECTION, SOLUTION INTRAVENOUS at 09:50

## 2017-12-22 RX ADMIN — IOPAMIDOL 100 ML: 755 INJECTION, SOLUTION INTRAVENOUS at 09:50

## 2018-01-10 ENCOUNTER — TELEPHONE (OUTPATIENT)
Dept: PODIATRY | Age: 53
End: 2018-01-10

## 2018-01-11 ENCOUNTER — OFFICE VISIT (OUTPATIENT)
Dept: PODIATRY | Age: 53
End: 2018-01-11
Payer: COMMERCIAL

## 2018-01-11 VITALS
HEIGHT: 75 IN | WEIGHT: 225 LBS | HEART RATE: 77 BPM | BODY MASS INDEX: 27.98 KG/M2 | DIASTOLIC BLOOD PRESSURE: 87 MMHG | SYSTOLIC BLOOD PRESSURE: 144 MMHG

## 2018-01-11 DIAGNOSIS — M79.672 PAIN IN LEFT FOOT: ICD-10-CM

## 2018-01-11 DIAGNOSIS — M72.2 PLANTAR FASCIITIS OF LEFT FOOT: Primary | ICD-10-CM

## 2018-01-11 PROCEDURE — G8484 FLU IMMUNIZE NO ADMIN: HCPCS | Performed by: PODIATRIST

## 2018-01-11 PROCEDURE — 20550 NJX 1 TENDON SHEATH/LIGAMENT: CPT | Performed by: PODIATRIST

## 2018-01-11 PROCEDURE — 3017F COLORECTAL CA SCREEN DOC REV: CPT | Performed by: PODIATRIST

## 2018-01-11 PROCEDURE — G8427 DOCREV CUR MEDS BY ELIG CLIN: HCPCS | Performed by: PODIATRIST

## 2018-01-11 PROCEDURE — 1036F TOBACCO NON-USER: CPT | Performed by: PODIATRIST

## 2018-01-11 PROCEDURE — G8419 CALC BMI OUT NRM PARAM NOF/U: HCPCS | Performed by: PODIATRIST

## 2018-01-11 RX ORDER — BUPIVACAINE HYDROCHLORIDE 5 MG/ML
1 INJECTION, SOLUTION PERINEURAL ONCE
Status: COMPLETED | OUTPATIENT
Start: 2018-01-11 | End: 2018-01-11

## 2018-01-11 RX ADMIN — BUPIVACAINE HYDROCHLORIDE 5 MG: 5 INJECTION, SOLUTION PERINEURAL at 16:29

## 2018-01-11 ASSESSMENT — ENCOUNTER SYMPTOMS
BACK PAIN: 0
NAUSEA: 0
COLOR CHANGE: 0
SHORTNESS OF BREATH: 0
DIARRHEA: 0

## 2018-01-11 NOTE — PROGRESS NOTES
Subjective: Gabbie Licea 45 y.o. male that presents for follow up evaluation of plantar fasciitis to the left foot. Chief Complaint   Patient presents with   [facility]     pt follow up left, still sore     Other     orthotics dispensed with instructions    Patient's treatment thus far has included physical therapy, Power Step inserts, casted for custom molded orthotics, icing and stretching. Pain is rated 8 out of 10 and is described as intermittent. Patient has been following my prescribed course of therapy as instructed. Review of Systems   Constitutional: Negative for activity change, appetite change, chills, diaphoresis, fatigue and fever. Respiratory: Negative for shortness of breath. Cardiovascular: Negative for leg swelling. Gastrointestinal: Negative for diarrhea and nausea. Endocrine: Negative for cold intolerance, heat intolerance and polyuria. Musculoskeletal: Positive for arthralgias. Negative for back pain, gait problem, joint swelling and myalgias. Skin: Negative for color change, pallor, rash and wound. Allergic/Immunologic: Negative for environmental allergies and food allergies. Neurological: Negative for dizziness, weakness, light-headedness and numbness. Hematological: Does not bruise/bleed easily. Psychiatric/Behavioral: Negative for behavioral problems, confusion and self-injury. The patient is not nervous/anxious. Objective: Clinical evaluation of the patient reveals pain with palpation to the plantar medial calcaneal tubercle of the left foot. There is pain with palpation to the plantar central aspect of the left heel. There is no pain with palpation to the blas pedis of the left foot. There is no pain with medial to lateral compression of the left calcaneus. Tinel's sign is negative to the left tarsal tunnel. There is no erythema, calor, or open lesion noted to the left foot or ankle. Assessment:   1.  Plantar fasciitis of left foot  bupivacaine

## 2018-02-01 ENCOUNTER — OFFICE VISIT (OUTPATIENT)
Dept: PODIATRY | Age: 53
End: 2018-02-01
Payer: COMMERCIAL

## 2018-02-01 VITALS
HEART RATE: 66 BPM | BODY MASS INDEX: 28.6 KG/M2 | WEIGHT: 230 LBS | DIASTOLIC BLOOD PRESSURE: 88 MMHG | HEIGHT: 75 IN | SYSTOLIC BLOOD PRESSURE: 130 MMHG

## 2018-02-01 DIAGNOSIS — M79.672 PAIN IN LEFT FOOT: ICD-10-CM

## 2018-02-01 DIAGNOSIS — M72.2 PLANTAR FASCIITIS OF LEFT FOOT: Primary | ICD-10-CM

## 2018-02-01 PROCEDURE — G8484 FLU IMMUNIZE NO ADMIN: HCPCS | Performed by: PODIATRIST

## 2018-02-01 PROCEDURE — 1036F TOBACCO NON-USER: CPT | Performed by: PODIATRIST

## 2018-02-01 PROCEDURE — G8427 DOCREV CUR MEDS BY ELIG CLIN: HCPCS | Performed by: PODIATRIST

## 2018-02-01 PROCEDURE — 3017F COLORECTAL CA SCREEN DOC REV: CPT | Performed by: PODIATRIST

## 2018-02-01 PROCEDURE — G8419 CALC BMI OUT NRM PARAM NOF/U: HCPCS | Performed by: PODIATRIST

## 2018-02-01 PROCEDURE — 99213 OFFICE O/P EST LOW 20 MIN: CPT | Performed by: PODIATRIST

## 2018-02-01 RX ORDER — POLYETHYLENE GLYCOL-3350 AND ELECTROLYTES 236; 6.74; 5.86; 2.97; 22.74 G/274.31G; G/274.31G; G/274.31G; G/274.31G; G/274.31G
POWDER, FOR SOLUTION ORAL
Refills: 0 | COMMUNITY
Start: 2018-01-08

## 2018-02-01 ASSESSMENT — ENCOUNTER SYMPTOMS
COLOR CHANGE: 0
NAUSEA: 0
BACK PAIN: 0
SHORTNESS OF BREATH: 0
DIARRHEA: 0

## 2018-02-01 NOTE — PROGRESS NOTES
fasciitis of left foot     2. Pain in left foot           Plan: 1. Clinical evaluation of the patient. 2.  Patient encouraged to continue with icing and stretching and with orthotics. 3. Return if symptoms worsen or fail to improve.    2/1/2018      Nereyda Grewal DPM

## 2018-02-08 ENCOUNTER — HOSPITAL ENCOUNTER (OUTPATIENT)
Dept: PREADMISSION TESTING | Age: 53
Discharge: HOME | End: 2018-02-12
Payer: COMMERCIAL

## 2018-02-08 VITALS
HEIGHT: 75 IN | RESPIRATION RATE: 16 BRPM | SYSTOLIC BLOOD PRESSURE: 129 MMHG | BODY MASS INDEX: 29.84 KG/M2 | TEMPERATURE: 98.6 F | DIASTOLIC BLOOD PRESSURE: 87 MMHG | OXYGEN SATURATION: 96 % | WEIGHT: 240 LBS | HEART RATE: 66 BPM

## 2018-02-08 LAB
ABSOLUTE EOS #: 0.71 K/UL (ref 0–0.4)
ABSOLUTE IMMATURE GRANULOCYTE: ABNORMAL K/UL (ref 0–0.3)
ABSOLUTE LYMPH #: 2.09 K/UL (ref 1–4.8)
ABSOLUTE MONO #: 0.36 K/UL (ref 0.1–1.3)
ANION GAP SERPL CALCULATED.3IONS-SCNC: 9 MMOL/L (ref 9–17)
BASOPHILS # BLD: 1 % (ref 0–2)
BASOPHILS ABSOLUTE: 0.05 K/UL (ref 0–0.2)
BUN BLDV-MCNC: 13 MG/DL (ref 6–20)
BUN/CREAT BLD: NORMAL (ref 9–20)
CALCIUM SERPL-MCNC: 9.6 MG/DL (ref 8.6–10.4)
CHLORIDE BLD-SCNC: 102 MMOL/L (ref 98–107)
CO2: 29 MMOL/L (ref 20–31)
CREAT SERPL-MCNC: 0.79 MG/DL (ref 0.7–1.2)
DIFFERENTIAL TYPE: ABNORMAL
EOSINOPHILS RELATIVE PERCENT: 14 % (ref 0–4)
GFR AFRICAN AMERICAN: >60 ML/MIN
GFR NON-AFRICAN AMERICAN: >60 ML/MIN
GFR SERPL CREATININE-BSD FRML MDRD: NORMAL ML/MIN/{1.73_M2}
GFR SERPL CREATININE-BSD FRML MDRD: NORMAL ML/MIN/{1.73_M2}
GLUCOSE BLD-MCNC: 90 MG/DL (ref 70–99)
HCT VFR BLD CALC: 36.1 % (ref 41–53)
HEMOGLOBIN: 12.4 G/DL (ref 13.5–17.5)
IMMATURE GRANULOCYTES: ABNORMAL %
LYMPHOCYTES # BLD: 41 % (ref 24–44)
MCH RBC QN AUTO: 31.9 PG (ref 26–34)
MCHC RBC AUTO-ENTMCNC: 34.3 G/DL (ref 31–37)
MCV RBC AUTO: 93 FL (ref 80–100)
MONOCYTES # BLD: 7 % (ref 1–7)
MORPHOLOGY: NORMAL
NRBC AUTOMATED: ABNORMAL PER 100 WBC
PDW BLD-RTO: 13.2 % (ref 11.5–14.9)
PLATELET # BLD: 294 K/UL (ref 150–450)
PLATELET ESTIMATE: ABNORMAL
PMV BLD AUTO: 7.7 FL (ref 6–12)
POTASSIUM SERPL-SCNC: 4.4 MMOL/L (ref 3.7–5.3)
RBC # BLD: 3.88 M/UL (ref 4.5–5.9)
RBC # BLD: ABNORMAL 10*6/UL
SEG NEUTROPHILS: 37 % (ref 36–66)
SEGMENTED NEUTROPHILS ABSOLUTE COUNT: 1.89 K/UL (ref 1.3–9.1)
SODIUM BLD-SCNC: 140 MMOL/L (ref 135–144)
WBC # BLD: 5.1 K/UL (ref 3.5–11)
WBC # BLD: ABNORMAL 10*3/UL

## 2018-02-08 PROCEDURE — 85025 COMPLETE CBC W/AUTO DIFF WBC: CPT

## 2018-02-08 PROCEDURE — 80048 BASIC METABOLIC PNL TOTAL CA: CPT

## 2018-02-09 ENCOUNTER — ANESTHESIA EVENT (OUTPATIENT)
Dept: OPERATING ROOM | Age: 53
End: 2018-02-09
Payer: COMMERCIAL

## 2018-02-09 RX ORDER — SODIUM CHLORIDE 0.9 % (FLUSH) 0.9 %
10 SYRINGE (ML) INJECTION PRN
Status: CANCELLED | OUTPATIENT
Start: 2018-02-09

## 2018-02-09 RX ORDER — SODIUM CHLORIDE, SODIUM LACTATE, POTASSIUM CHLORIDE, CALCIUM CHLORIDE 600; 310; 30; 20 MG/100ML; MG/100ML; MG/100ML; MG/100ML
INJECTION, SOLUTION INTRAVENOUS CONTINUOUS
Status: CANCELLED | OUTPATIENT
Start: 2018-02-09

## 2018-02-09 RX ORDER — LIDOCAINE HYDROCHLORIDE 10 MG/ML
1 INJECTION, SOLUTION EPIDURAL; INFILTRATION; INTRACAUDAL; PERINEURAL
Status: CANCELLED | OUTPATIENT
Start: 2018-02-09 | End: 2018-02-09

## 2018-02-09 RX ORDER — SODIUM CHLORIDE 0.9 % (FLUSH) 0.9 %
10 SYRINGE (ML) INJECTION EVERY 12 HOURS SCHEDULED
Status: CANCELLED | OUTPATIENT
Start: 2018-02-09

## 2018-02-19 ENCOUNTER — ANESTHESIA (OUTPATIENT)
Dept: OPERATING ROOM | Age: 53
End: 2018-02-19
Payer: COMMERCIAL

## 2018-02-19 ENCOUNTER — HOSPITAL ENCOUNTER (OUTPATIENT)
Age: 53
Setting detail: OUTPATIENT SURGERY
Discharge: HOME | End: 2018-02-19
Attending: SURGERY | Admitting: SURGERY
Payer: COMMERCIAL

## 2018-02-19 VITALS
DIASTOLIC BLOOD PRESSURE: 80 MMHG | BODY MASS INDEX: 29.84 KG/M2 | OXYGEN SATURATION: 98 % | HEART RATE: 78 BPM | WEIGHT: 240 LBS | SYSTOLIC BLOOD PRESSURE: 121 MMHG | RESPIRATION RATE: 12 BRPM | HEIGHT: 75 IN | TEMPERATURE: 96.9 F

## 2018-02-19 VITALS — OXYGEN SATURATION: 100 % | TEMPERATURE: 94.6 F | DIASTOLIC BLOOD PRESSURE: 57 MMHG | SYSTOLIC BLOOD PRESSURE: 106 MMHG

## 2018-02-19 DIAGNOSIS — L72.9 CYST OF SKIN: Primary | ICD-10-CM

## 2018-02-19 PROCEDURE — 3700000000 HC ANESTHESIA ATTENDED CARE: Performed by: SURGERY

## 2018-02-19 PROCEDURE — 2580000003 HC RX 258: Performed by: ANESTHESIOLOGY

## 2018-02-19 PROCEDURE — 88305 TISSUE EXAM BY PATHOLOGIST: CPT

## 2018-02-19 PROCEDURE — 3609010300 HC COLONOSCOPY W/BIOPSY SINGLE/MULTIPLE: Performed by: SURGERY

## 2018-02-19 PROCEDURE — 7100000030 HC ASPR PHASE II RECOVERY - FIRST 15 MIN: Performed by: SURGERY

## 2018-02-19 PROCEDURE — 2500000003 HC RX 250 WO HCPCS: Performed by: SURGERY

## 2018-02-19 PROCEDURE — 7100000000 HC PACU RECOVERY - FIRST 15 MIN: Performed by: SURGERY

## 2018-02-19 PROCEDURE — 3700000001 HC ADD 15 MINUTES (ANESTHESIA): Performed by: SURGERY

## 2018-02-19 PROCEDURE — 7100000001 HC PACU RECOVERY - ADDTL 15 MIN: Performed by: SURGERY

## 2018-02-19 PROCEDURE — 6360000002 HC RX W HCPCS: Performed by: NURSE ANESTHETIST, CERTIFIED REGISTERED

## 2018-02-19 PROCEDURE — A6402 STERILE GAUZE <= 16 SQ IN: HCPCS | Performed by: SURGERY

## 2018-02-19 PROCEDURE — 88304 TISSUE EXAM BY PATHOLOGIST: CPT

## 2018-02-19 PROCEDURE — 2580000003 HC RX 258: Performed by: NURSE ANESTHETIST, CERTIFIED REGISTERED

## 2018-02-19 PROCEDURE — 2500000003 HC RX 250 WO HCPCS: Performed by: NURSE ANESTHETIST, CERTIFIED REGISTERED

## 2018-02-19 PROCEDURE — 7100000031 HC ASPR PHASE II RECOVERY - ADDTL 15 MIN: Performed by: SURGERY

## 2018-02-19 RX ORDER — MORPHINE SULFATE 2 MG/ML
2 INJECTION, SOLUTION INTRAMUSCULAR; INTRAVENOUS EVERY 5 MIN PRN
Status: DISCONTINUED | OUTPATIENT
Start: 2018-02-19 | End: 2018-02-19 | Stop reason: HOSPADM

## 2018-02-19 RX ORDER — FENTANYL CITRATE 50 UG/ML
25 INJECTION, SOLUTION INTRAMUSCULAR; INTRAVENOUS EVERY 5 MIN PRN
Status: DISCONTINUED | OUTPATIENT
Start: 2018-02-19 | End: 2018-02-19 | Stop reason: HOSPADM

## 2018-02-19 RX ORDER — ONDANSETRON 2 MG/ML
INJECTION INTRAMUSCULAR; INTRAVENOUS PRN
Status: DISCONTINUED | OUTPATIENT
Start: 2018-02-19 | End: 2018-02-19 | Stop reason: SDUPTHER

## 2018-02-19 RX ORDER — SODIUM CHLORIDE 0.9 % (FLUSH) 0.9 %
10 SYRINGE (ML) INJECTION EVERY 12 HOURS SCHEDULED
Status: DISCONTINUED | OUTPATIENT
Start: 2018-02-19 | End: 2018-02-19 | Stop reason: HOSPADM

## 2018-02-19 RX ORDER — ONDANSETRON 4 MG/1
TABLET, FILM COATED ORAL
Qty: 20 TABLET | Refills: 0 | Status: SHIPPED | OUTPATIENT
Start: 2018-02-19

## 2018-02-19 RX ORDER — EPHEDRINE SULFATE 50 MG/ML
INJECTION, SOLUTION INTRAVENOUS PRN
Status: DISCONTINUED | OUTPATIENT
Start: 2018-02-19 | End: 2018-02-19 | Stop reason: SDUPTHER

## 2018-02-19 RX ORDER — OXYCODONE HYDROCHLORIDE AND ACETAMINOPHEN 5; 325 MG/1; MG/1
1 TABLET ORAL EVERY 6 HOURS PRN
Qty: 28 TABLET | Refills: 0 | Status: SHIPPED | OUTPATIENT
Start: 2018-02-19 | End: 2018-02-26

## 2018-02-19 RX ORDER — HYDRALAZINE HYDROCHLORIDE 20 MG/ML
5 INJECTION INTRAMUSCULAR; INTRAVENOUS EVERY 10 MIN PRN
Status: DISCONTINUED | OUTPATIENT
Start: 2018-02-19 | End: 2018-02-19 | Stop reason: HOSPADM

## 2018-02-19 RX ORDER — PROMETHAZINE HYDROCHLORIDE 25 MG/ML
6.25 INJECTION, SOLUTION INTRAMUSCULAR; INTRAVENOUS
Status: DISCONTINUED | OUTPATIENT
Start: 2018-02-19 | End: 2018-02-19 | Stop reason: HOSPADM

## 2018-02-19 RX ORDER — SODIUM CHLORIDE, SODIUM LACTATE, POTASSIUM CHLORIDE, CALCIUM CHLORIDE 600; 310; 30; 20 MG/100ML; MG/100ML; MG/100ML; MG/100ML
INJECTION, SOLUTION INTRAVENOUS CONTINUOUS PRN
Status: DISCONTINUED | OUTPATIENT
Start: 2018-02-19 | End: 2018-02-19 | Stop reason: SDUPTHER

## 2018-02-19 RX ORDER — SODIUM CHLORIDE, SODIUM LACTATE, POTASSIUM CHLORIDE, CALCIUM CHLORIDE 600; 310; 30; 20 MG/100ML; MG/100ML; MG/100ML; MG/100ML
INJECTION, SOLUTION INTRAVENOUS CONTINUOUS
Status: DISCONTINUED | OUTPATIENT
Start: 2018-02-19 | End: 2018-02-19 | Stop reason: HOSPADM

## 2018-02-19 RX ORDER — FENTANYL CITRATE 50 UG/ML
INJECTION, SOLUTION INTRAMUSCULAR; INTRAVENOUS PRN
Status: DISCONTINUED | OUTPATIENT
Start: 2018-02-19 | End: 2018-02-19 | Stop reason: SDUPTHER

## 2018-02-19 RX ORDER — BUPIVACAINE HYDROCHLORIDE 5 MG/ML
INJECTION, SOLUTION EPIDURAL; INTRACAUDAL PRN
Status: DISCONTINUED | OUTPATIENT
Start: 2018-02-19 | End: 2018-02-19 | Stop reason: HOSPADM

## 2018-02-19 RX ORDER — SODIUM CHLORIDE 0.9 % (FLUSH) 0.9 %
10 SYRINGE (ML) INJECTION PRN
Status: DISCONTINUED | OUTPATIENT
Start: 2018-02-19 | End: 2018-02-19 | Stop reason: HOSPADM

## 2018-02-19 RX ORDER — CEFAZOLIN SODIUM 1 G/3ML
INJECTION, POWDER, FOR SOLUTION INTRAMUSCULAR; INTRAVENOUS PRN
Status: DISCONTINUED | OUTPATIENT
Start: 2018-02-19 | End: 2018-02-19 | Stop reason: SDUPTHER

## 2018-02-19 RX ORDER — LIDOCAINE HYDROCHLORIDE 10 MG/ML
1 INJECTION, SOLUTION EPIDURAL; INFILTRATION; INTRACAUDAL; PERINEURAL
Status: DISCONTINUED | OUTPATIENT
Start: 2018-02-19 | End: 2018-02-19 | Stop reason: HOSPADM

## 2018-02-19 RX ORDER — DIPHENHYDRAMINE HYDROCHLORIDE 50 MG/ML
12.5 INJECTION INTRAMUSCULAR; INTRAVENOUS
Status: DISCONTINUED | OUTPATIENT
Start: 2018-02-19 | End: 2018-02-19 | Stop reason: HOSPADM

## 2018-02-19 RX ORDER — HYDROCODONE BITARTRATE AND ACETAMINOPHEN 5; 325 MG/1; MG/1
1 TABLET ORAL
Status: DISCONTINUED | OUTPATIENT
Start: 2018-02-19 | End: 2018-02-19 | Stop reason: HOSPADM

## 2018-02-19 RX ORDER — MEPERIDINE HYDROCHLORIDE 50 MG/ML
12.5 INJECTION INTRAMUSCULAR; INTRAVENOUS; SUBCUTANEOUS EVERY 5 MIN PRN
Status: DISCONTINUED | OUTPATIENT
Start: 2018-02-19 | End: 2018-02-19 | Stop reason: HOSPADM

## 2018-02-19 RX ORDER — PROPOFOL 10 MG/ML
INJECTION, EMULSION INTRAVENOUS PRN
Status: DISCONTINUED | OUTPATIENT
Start: 2018-02-19 | End: 2018-02-19 | Stop reason: SDUPTHER

## 2018-02-19 RX ORDER — LIDOCAINE HYDROCHLORIDE 10 MG/ML
INJECTION, SOLUTION EPIDURAL; INFILTRATION; INTRACAUDAL; PERINEURAL PRN
Status: DISCONTINUED | OUTPATIENT
Start: 2018-02-19 | End: 2018-02-19 | Stop reason: SDUPTHER

## 2018-02-19 RX ORDER — 0.9 % SODIUM CHLORIDE 0.9 %
500 INTRAVENOUS SOLUTION INTRAVENOUS
Status: DISCONTINUED | OUTPATIENT
Start: 2018-02-19 | End: 2018-02-19 | Stop reason: HOSPADM

## 2018-02-19 RX ORDER — CEPHALEXIN 500 MG/1
CAPSULE ORAL
Qty: 21 CAPSULE | Refills: 0 | Status: SHIPPED | OUTPATIENT
Start: 2018-02-19

## 2018-02-19 RX ORDER — DEXAMETHASONE SODIUM PHOSPHATE 4 MG/ML
INJECTION, SOLUTION INTRA-ARTICULAR; INTRALESIONAL; INTRAMUSCULAR; INTRAVENOUS; SOFT TISSUE PRN
Status: DISCONTINUED | OUTPATIENT
Start: 2018-02-19 | End: 2018-02-19 | Stop reason: SDUPTHER

## 2018-02-19 RX ORDER — MIDAZOLAM HYDROCHLORIDE 1 MG/ML
INJECTION INTRAMUSCULAR; INTRAVENOUS PRN
Status: DISCONTINUED | OUTPATIENT
Start: 2018-02-19 | End: 2018-02-19 | Stop reason: SDUPTHER

## 2018-02-19 RX ADMIN — LIDOCAINE HYDROCHLORIDE 50 MG: 10 INJECTION, SOLUTION EPIDURAL; INFILTRATION; INTRACAUDAL; PERINEURAL at 07:10

## 2018-02-19 RX ADMIN — PROPOFOL 300 MG: 10 INJECTION, EMULSION INTRAVENOUS at 07:10

## 2018-02-19 RX ADMIN — CEFAZOLIN 2000 MG: 1 INJECTION, POWDER, FOR SOLUTION INTRAMUSCULAR; INTRAVENOUS at 07:21

## 2018-02-19 RX ADMIN — EPHEDRINE SULFATE 10 MG: 50 INJECTION INTRAMUSCULAR; INTRAVENOUS; SUBCUTANEOUS at 07:47

## 2018-02-19 RX ADMIN — EPHEDRINE SULFATE 5 MG: 50 INJECTION INTRAMUSCULAR; INTRAVENOUS; SUBCUTANEOUS at 07:50

## 2018-02-19 RX ADMIN — FENTANYL CITRATE 100 MCG: 50 INJECTION, SOLUTION INTRAMUSCULAR; INTRAVENOUS at 07:10

## 2018-02-19 RX ADMIN — SODIUM CHLORIDE, POTASSIUM CHLORIDE, SODIUM LACTATE AND CALCIUM CHLORIDE: 600; 310; 30; 20 INJECTION, SOLUTION INTRAVENOUS at 06:58

## 2018-02-19 RX ADMIN — MIDAZOLAM 2 MG: 1 INJECTION INTRAMUSCULAR; INTRAVENOUS at 07:10

## 2018-02-19 RX ADMIN — DEXAMETHASONE SODIUM PHOSPHATE 4 MG: 4 INJECTION, SOLUTION INTRAMUSCULAR; INTRAVENOUS at 07:28

## 2018-02-19 RX ADMIN — ONDANSETRON 4 MG: 2 INJECTION INTRAMUSCULAR; INTRAVENOUS at 07:28

## 2018-02-19 RX ADMIN — SODIUM CHLORIDE, POTASSIUM CHLORIDE, SODIUM LACTATE AND CALCIUM CHLORIDE: 600; 310; 30; 20 INJECTION, SOLUTION INTRAVENOUS at 07:09

## 2018-02-19 ASSESSMENT — PULMONARY FUNCTION TESTS
PIF_VALUE: 16
PIF_VALUE: 12
PIF_VALUE: 20
PIF_VALUE: 13
PIF_VALUE: 15
PIF_VALUE: 15
PIF_VALUE: 13
PIF_VALUE: 15
PIF_VALUE: 17
PIF_VALUE: 13
PIF_VALUE: 1
PIF_VALUE: 13
PIF_VALUE: 13
PIF_VALUE: 2
PIF_VALUE: 15
PIF_VALUE: 14
PIF_VALUE: 13
PIF_VALUE: 12
PIF_VALUE: 13
PIF_VALUE: 15
PIF_VALUE: 13
PIF_VALUE: 14
PIF_VALUE: 14
PIF_VALUE: 13
PIF_VALUE: 13
PIF_VALUE: 15
PIF_VALUE: 12
PIF_VALUE: 15
PIF_VALUE: 13
PIF_VALUE: 14
PIF_VALUE: 15
PIF_VALUE: 15
PIF_VALUE: 13
PIF_VALUE: 2
PIF_VALUE: 15
PIF_VALUE: 14
PIF_VALUE: 14
PIF_VALUE: 1
PIF_VALUE: 13
PIF_VALUE: 5
PIF_VALUE: 14
PIF_VALUE: 13
PIF_VALUE: 14
PIF_VALUE: 14
PIF_VALUE: 5
PIF_VALUE: 13
PIF_VALUE: 15
PIF_VALUE: 13
PIF_VALUE: 15
PIF_VALUE: 1
PIF_VALUE: 13
PIF_VALUE: 13
PIF_VALUE: 14
PIF_VALUE: 13
PIF_VALUE: 13
PIF_VALUE: 1
PIF_VALUE: 14
PIF_VALUE: 12

## 2018-02-19 ASSESSMENT — PAIN - FUNCTIONAL ASSESSMENT: PAIN_FUNCTIONAL_ASSESSMENT: 0-10

## 2018-02-19 ASSESSMENT — PAIN SCALES - GENERAL: PAINLEVEL_OUTOF10: 0

## 2018-02-19 NOTE — ANESTHESIA PRE PROCEDURE
Cardiovascular:    (+) hyperlipidemia      ECG reviewed  Rhythm: regular  Rate: normal                    Neuro/Psych:   Negative Neuro/Psych ROS              GI/Hepatic/Renal: Neg GI/Hepatic/Renal ROS            Endo/Other: Negative Endo/Other ROS                    Abdominal:           Vascular:                                        Anesthesia Plan      general and MAC     ASA 2       Induction: intravenous. MIPS: Postoperative opioids intended and Prophylactic antiemetics administered. Anesthetic plan and risks discussed with patient. Plan discussed with CRNA.                   Rambo Montez MD   2/19/2018

## 2018-02-19 NOTE — H&P
PREVIDENT 5000 SENSITIVE 1.1-5 % PSTE USE AS DIRECTED DAILY   6    GAVILYTE-G 236 g solution MIX AND DRINK UTD   0      No current facility-administered medications on file prior to encounter.       General health:  Good. No fever or chills. Skin:  No itching, redness or rash. HEENT:  No headache, epistaxis or sore throat. Neck:  No pain, stiffness or masses. Cardiovascular/Respiratory system:  No chest pain, palpitation or shortness of breath. Gastrointestinal tract: See HPI. Genitourinary:  No burning on micturition. No hesitancy, urgency, frequency or discoloration of urine. Locomotor:  No bone or joint pains. No swelling. Neuropsychiatric:  No referable complaints.                  GENERAL PHYSICAL EXAM:      Vitals: /87   Pulse 66   Temp 98.6 °F (37 °C) (Oral)   Resp 16   Ht 6' 3\" (1.905 m)   Wt 240 lb (108.9 kg)   SpO2 96%   BMI 30.00 kg/m²  Body mass index is 30 kg/m².      GENERAL APPEARANCE:   Kat Lowe is 45 y.o.,  male, mildly obese, nourished, conscious, alert. Does not appear to be distress or pain at this time. SKIN:  Warm, dry, no cyanosis or jaundice. Patient has very small, approximately 1 x 1 cm cyst near left scapula. Non tender, not warm, no erythema. Two previous scars from cyst excision to right upper back. HEAD:  Normocephalic, atraumatic, no swelling or tenderness. EYES:  Pupils equal, reactive to light and accomodation. Conjunctiva clear. EOMs intact bilaterally. EARS:  No discharge, no marked hearing loss. NOSE:  No rhinorrhea, epistaxis or septal deformity. THROAT:  Not congested. No ulceration bleeding or discharge. NECK:  No stiffness, trachea central.  No palpable masses.                  CHEST:  Symmetrical and equal on expansion. HEART:  Regular rate, rhythm. S1 > S2. No audible murmurs or gallops. LUNGS:  Equal on expansion. Clear to auscultation with no adventitious sounds. ABDOMEN:  Slightly obese. Normoactive bowel sounds. Soft on palpation. No localized tenderness. No guarding or rigidity. No palpable organomegaly. LYMPHATICS:  No palpable cervical lymphadenopathy.      LOCOMOTOR, BACK AND SPINE:  No tenderness or deformities. No flank tenderness. EXTREMITIES:  Symmetrical with no pretibial/pedal edema. No discoloration or ulcerations. No warmth, tenderness, erythema noted in lower legs bilaterally.       NEUROLOGIC:  The patient is conscious, alert, oriented. No apparent focal sensory or motor deficits. Cranial nerve exam reveals no deficits.                                                                        PROVISIONAL DIAGNOSES / SURGERY:       02/19/18:  Previous Abnormal Colonoscopy  Colonoscopy      02/23/18:  Cyst, Left Scapula  Excision of Cyst, Left Scapula          Patient Active Problem List     Diagnosis Date Noted    Hyperlipidemia 10/30/2017    S/P bilateral inguinal herniorrhaphy 10/27/2017            Jacob Gant PA-C on 2/8/2018 at 4:50 PM         Cosigned by: Tessy Ward MD at 2/9/2018  4:39 PM   Revision History                        Routing History

## 2018-02-19 NOTE — OP NOTE
Preoperative diagnosis: Screening colonoscopy/symptomatic cyst left scapula    Postoperative diagnosis: Sigmoid diverticulosis/grade 2 internal hemorrhoids and sigmoid polyp/symptomatic cyst left scapula 2 x 1.5 x 1 cm    Procedure: Total colonoscopy to cecum with polypectomy/excision epidermal cyst left scapular 2 x 1.5 x 1 cm    Surgeon: Dr. Governor Stella Whitaker.: None    Anesthesia: Gen. Preparation: ChloraPrep and mechanical bowel prep    EBL: None    Specimen: Cyst left scapula and sigmoid colon polyp    Procedure: 77-year-old gentleman was scheduled for screening colonoscopy and excision of the cyst on the left scapula. Informed consent was obtained. Preoperative antibiotics were given. Patient was taken to the operating room. Site was marked and confirmed in preop. Patient was taken to the operating room. Gen. anesthesia was given. He was placed in the right lateral position. Operative site was prepped and draped usual sterile fashion. Timeout was done. Incision was made using a #15 blade. Dissection was carried out. Approximately 2 x 1.5 x 1 cm cyst was excised. Specimen was labeled and sent to pathology. Wound was explored. Hemostasis was confirmed. Sponge and needle instrument count was found to be correct. Wound was approximated using absorbable sutures followed by simple interrupted nylon sutures on the skin. Local anesthetic was infiltrated. Clean dressing was applied. Patient tolerated this part of the procedure well. Gen. anesthesia was continued. Patient was placed in the lithotomy position. Timeout was done. Rectal exam revealed no masses or bleeding. Olympus colonoscope was advanced through the rectum to the level of cecum without any difficulty. The entire colon was inspected. Patient was found to have sigmoid diverticulosis without any evidence of diverticulitis. There was a sigmoid polyp noted that was removed using cold biopsy forceps.   Rest of the colonoscopy examination did not show any masses polypoid lesions or colitis. Prep was adequate. The entire colon was inspected. Scope was withdrawn back into the rectum. Scope was retroflexed. Grade 2 internal hemorrhoids noted. No other pathology was seen. Scope was straightened and removed. Patient tolerated procedure well and was transferred to the recovery room in a stable condition.     Recommendations: Findings are discussed with the patient's family at length. No restriction of activities. Patient may shower. Change dressing as needed. Follow-up in the office in 10-14 days. Family was asked to call and make an appointment. Prescriptions are in the chart.

## 2018-02-20 LAB — SURGICAL PATHOLOGY REPORT: NORMAL

## 2018-03-12 DIAGNOSIS — E78.5 HYPERLIPIDEMIA, UNSPECIFIED HYPERLIPIDEMIA TYPE: ICD-10-CM

## 2018-03-12 RX ORDER — ROSUVASTATIN CALCIUM 10 MG/1
10 TABLET, COATED ORAL DAILY
Qty: 30 TABLET | Refills: 0 | Status: SHIPPED | OUTPATIENT
Start: 2018-03-12

## 2021-04-04 NOTE — PROGRESS NOTES
PTS ABD SOFTLY ROUNDED. PT DENIES ANY C/O PAIN OF DISCOMFORT. PT IS PASSING GAS. DR Yassine Oconnell INSTRUCTED RICH JI CST TO REMOVE EVERY OTHER STAPLE FROM PTS HEALING INCISION ON RIGHT UPPER BACK. STAPLES REMOVED WITHOUT INCIDENT. INCISION WAS REDRESSED WITH TELFA AND COVERED WITH TEGADERM. Clear

## 2023-09-05 ENCOUNTER — OFFICE VISIT (OUTPATIENT)
Dept: PODIATRY | Age: 58
End: 2023-09-05
Payer: COMMERCIAL

## 2023-09-05 VITALS — BODY MASS INDEX: 29.84 KG/M2 | WEIGHT: 240 LBS | HEIGHT: 75 IN

## 2023-09-05 DIAGNOSIS — M79.671 PAIN IN RIGHT FOOT: ICD-10-CM

## 2023-09-05 DIAGNOSIS — M72.2 PLANTAR FASCIITIS OF RIGHT FOOT: Primary | ICD-10-CM

## 2023-09-05 PROCEDURE — 99203 OFFICE O/P NEW LOW 30 MIN: CPT | Performed by: PODIATRIST

## 2023-09-05 PROCEDURE — 20550 NJX 1 TENDON SHEATH/LIGAMENT: CPT | Performed by: PODIATRIST

## 2023-09-05 RX ORDER — BUPIVACAINE HYDROCHLORIDE 5 MG/ML
1 INJECTION, SOLUTION PERINEURAL ONCE
Status: COMPLETED | OUTPATIENT
Start: 2023-09-05 | End: 2023-09-05

## 2023-09-05 RX ADMIN — BUPIVACAINE HYDROCHLORIDE 5 MG: 5 INJECTION, SOLUTION PERINEURAL at 15:06

## 2023-09-05 ASSESSMENT — ENCOUNTER SYMPTOMS
NAUSEA: 0
COLOR CHANGE: 0
SHORTNESS OF BREATH: 0
BACK PAIN: 0
DIARRHEA: 0

## 2023-09-05 NOTE — PROGRESS NOTES
Dar Lester is a 59 y.o. male who presents to the office today with chief complaint of pain to the right heel  Chief Complaint   Patient presents with    Foot Pain     Right heel pain for a little over 1 month    Symptoms began about 1 month(s) ago. Patient denies injury to the right foot. Patient states that the pain is greatest when getting out of bed in the morning and after periods of rest. Pain is rated 10 out of 10 at it's worst and is described as intermittent. Treatments prior to today's visit include: None. No Known Allergies    Past Medical History:   Diagnosis Date    Hyperlipidemia        Prior to Admission medications    Medication Sig Start Date End Date Taking? Authorizing Provider   rosuvastatin (CRESTOR) 10 MG tablet TAKE 1 TABLET BY MOUTH DAILY 3/12/18  Yes Christine Hager MD       Past Surgical History:   Procedure Laterality Date    COLONOSCOPY      COLONOSCOPY N/A 2/19/2018    COLONOSCOPY WITH BIOPSY performed by Nola Pena MD at [facility] Right     HERNIA REPAIR Bilateral 2006    NH COLON CA SCRN NOT HI [address] IND N/A 12/15/2017    COLONOSCOPY performed by Nola Pena MD [place] [facility] TISSUE BACK/FLANK SUBQ <3CM N/A 12/1/2017    BACK LESION EXCISION UPPER x2 performed by Nola Pena MD at [facility] [address]      finger    TONSILLECTOMY      VASCULAR SURGERY      VEIN SURGERY Bilateral     legs       Family History   Problem Relation Age of Onset    Cancer Mother         colon cancer       Social History     Tobacco Use    Smoking status: Former    Smokeless tobacco: Never   Substance Use Topics    Alcohol use: Yes     Comment: occassional       Review of Systems   Constitutional:  Negative for activity change, appetite change, chills, diaphoresis, fatigue and fever. Respiratory:  Negative for shortness of breath. Cardiovascular:  Negative for leg swelling. Gastrointestinal:  Negative for diarrhea and nausea.    Endocrine:

## 2023-09-19 ENCOUNTER — OFFICE VISIT (OUTPATIENT)
Dept: PODIATRY | Age: 58
End: 2023-09-19
Payer: COMMERCIAL

## 2023-09-19 VITALS — WEIGHT: 240 LBS | BODY MASS INDEX: 29.84 KG/M2 | HEIGHT: 75 IN

## 2023-09-19 DIAGNOSIS — M79.671 PAIN IN RIGHT FOOT: ICD-10-CM

## 2023-09-19 DIAGNOSIS — M72.2 PLANTAR FASCIITIS OF RIGHT FOOT: Primary | ICD-10-CM

## 2023-09-19 PROCEDURE — 20550 NJX 1 TENDON SHEATH/LIGAMENT: CPT | Performed by: PODIATRIST

## 2023-09-19 PROCEDURE — 99999 PR OFFICE/OUTPT VISIT,PROCEDURE ONLY: CPT | Performed by: PODIATRIST

## 2023-09-19 RX ORDER — BUPIVACAINE HYDROCHLORIDE 5 MG/ML
30 INJECTION, SOLUTION PERINEURAL ONCE
Status: COMPLETED | OUTPATIENT
Start: 2023-09-19 | End: 2023-09-19

## 2023-09-19 RX ADMIN — BUPIVACAINE HYDROCHLORIDE 150 MG: 5 INJECTION, SOLUTION PERINEURAL at 15:47

## 2023-09-19 NOTE — PROGRESS NOTES
Diagnosis Orders   1. Plantar fasciitis of right foot  bupivacaine (MARCAINE) 0.5 % injection 150 mg    triamcinolone acetonide (KENALOG) injection 10 mg    VT INJECTION 1 TENDON SHEATH/LIGAMENT APONEUROSIS      2. Pain in right foot  bupivacaine (MARCAINE) 0.5 % injection 150 mg    triamcinolone acetonide (KENALOG) injection 10 mg    VT INJECTION 1 TENDON SHEATH/LIGAMENT APONEUROSIS            Plan: 1. Clinical evaluation of the patient. 2.  I recommended a steroid injection to the Right plantar heel. Verbal consent was obtained from the patient after all questions answered. The Right heel was prepped with an alcohol swab. The injection was placed at the most tender area using a 3.0 cc total of a combination of 1.5 cc of 0.5% Marcaine plain and 1.5 cc of Kenalog 10 mg/ml. A band-aid was applied, patient advised of possible local steroid reaction symptoms, and patient instructed to limit activities to this area for 24 hours. Patient to continue with icing and stretching of the right foot. Patient informed that if his pain has not improved by next visit, then I will consider sending him to physical therapy. 3. Return in about 2 weeks (around 10/3/2023) for Evaluation of plantar fasciitis.    9/19/2023      Queta Sumner DPM

## 2023-09-21 ASSESSMENT — ENCOUNTER SYMPTOMS
DIARRHEA: 0
COLOR CHANGE: 0
NAUSEA: 0
SHORTNESS OF BREATH: 0
BACK PAIN: 0

## 2023-10-11 RX ORDER — ROSUVASTATIN CALCIUM 20 MG/1
20 TABLET, COATED ORAL DAILY
Qty: 30 TABLET | Refills: 0 | Status: SHIPPED | OUTPATIENT
Start: 2023-10-11 | End: 2023-10-12

## 2023-10-12 ENCOUNTER — OFFICE VISIT (OUTPATIENT)
Dept: PODIATRY | Age: 58
End: 2023-10-12
Payer: COMMERCIAL

## 2023-10-12 VITALS — HEIGHT: 75 IN | WEIGHT: 240 LBS | BODY MASS INDEX: 29.84 KG/M2

## 2023-10-12 DIAGNOSIS — M79.671 PAIN IN RIGHT FOOT: ICD-10-CM

## 2023-10-12 DIAGNOSIS — M72.2 PLANTAR FASCIITIS OF RIGHT FOOT: Primary | ICD-10-CM

## 2023-10-12 PROCEDURE — 99213 OFFICE O/P EST LOW 20 MIN: CPT | Performed by: PODIATRIST

## 2023-10-12 RX ORDER — ROSUVASTATIN CALCIUM 20 MG/1
20 TABLET, COATED ORAL DAILY
Qty: 90 TABLET | Refills: 2 | Status: SHIPPED | OUTPATIENT
Start: 2023-10-12 | End: 2023-11-22 | Stop reason: SDUPTHER

## 2023-10-12 ASSESSMENT — ENCOUNTER SYMPTOMS
SHORTNESS OF BREATH: 0
COLOR CHANGE: 0
BACK PAIN: 0
DIARRHEA: 0
NAUSEA: 0

## 2023-10-12 NOTE — PROGRESS NOTES
[address] [facility] Podiatry  Return Patient Progress Note    Subjective: Jono Bee 59 y.o. male that presents for follow up evaluation of plantar fasciitis to the right foot. Chief Complaint   Patient presents with    Foot Pain     Right Plantar Heel Pain     Patient's treatment thus far has included steroid injection x2, icing and stretching. Pain is rated 3 out of 10 and is described as intermittent. Patient has been following my prescribed course of therapy as instructed. Patient states that his pain is to the arch of the right foot today include up to the heel. Patient states that the pain to his heel has resolved. Patient states that a few days after his last visit he felt a pop in his right foot and ever since then he has had a lump to the arch of the foot. Patient states that he has been massaging this area nightly and this does help to relieve his pain. Review of Systems   Constitutional:  Negative for activity change, appetite change, chills, diaphoresis, fatigue and fever. Respiratory:  Negative for shortness of breath. Cardiovascular:  Negative for leg swelling. Gastrointestinal:  Negative for diarrhea and nausea. Endocrine: Negative for cold intolerance, heat intolerance and polyuria. Musculoskeletal:  Positive for arthralgias. Negative for back pain, gait problem, joint swelling and myalgias. Skin:  Negative for color change, pallor, rash and wound. Allergic/Immunologic: Negative for environmental allergies and food allergies. Neurological:  Negative for dizziness, weakness, light-headedness and numbness. Hematological:  Does not bruise/bleed easily. Psychiatric/Behavioral:  Negative for behavioral problems, confusion and self-injury. The patient is not nervous/anxious. Objective: Clinical evaluation of the patient reveals no pain today with palpation to the plantar medial calcaneal tubercle of the right foot.   There is no pain with palpation to the plantar

## 2023-11-22 RX ORDER — ROSUVASTATIN CALCIUM 20 MG/1
20 TABLET, COATED ORAL DAILY
Qty: 90 TABLET | Refills: 0 | Status: SHIPPED | OUTPATIENT
Start: 2023-11-22

## 2023-12-16 ENCOUNTER — HOSPITAL ENCOUNTER (OUTPATIENT)
Dept: GENERAL RADIOLOGY | Age: 58
End: 2023-12-16
Payer: COMMERCIAL

## 2023-12-16 ENCOUNTER — HOSPITAL ENCOUNTER (OUTPATIENT)
Age: 58
End: 2023-12-16
Payer: COMMERCIAL

## 2023-12-16 ENCOUNTER — HOSPITAL ENCOUNTER (OUTPATIENT)
Age: 58
Discharge: HOME | End: 2023-12-16
Payer: COMMERCIAL

## 2023-12-16 DIAGNOSIS — M25.512 CHRONIC PAIN OF BOTH SHOULDERS: ICD-10-CM

## 2023-12-16 DIAGNOSIS — M25.551 HIP PAIN, BILATERAL: ICD-10-CM

## 2023-12-16 DIAGNOSIS — G89.29 CHRONIC PAIN OF BOTH SHOULDERS: ICD-10-CM

## 2023-12-16 DIAGNOSIS — M16.11 PRIMARY OSTEOARTHRITIS OF RIGHT HIP: ICD-10-CM

## 2023-12-16 DIAGNOSIS — M19.011 PRIMARY OSTEOARTHRITIS OF BOTH SHOULDERS: ICD-10-CM

## 2023-12-16 DIAGNOSIS — M25.522 PAIN OF BOTH ELBOWS: ICD-10-CM

## 2023-12-16 DIAGNOSIS — M25.511 CHRONIC PAIN OF BOTH SHOULDERS: ICD-10-CM

## 2023-12-16 DIAGNOSIS — M19.012 PRIMARY OSTEOARTHRITIS OF BOTH SHOULDERS: ICD-10-CM

## 2023-12-16 DIAGNOSIS — M25.521 PAIN OF BOTH ELBOWS: ICD-10-CM

## 2023-12-16 DIAGNOSIS — E66.3 OVERWEIGHT (BMI 25.0-29.9): ICD-10-CM

## 2023-12-16 DIAGNOSIS — E78.2 MIXED HYPERLIPIDEMIA: ICD-10-CM

## 2023-12-16 DIAGNOSIS — M25.552 HIP PAIN, BILATERAL: ICD-10-CM

## 2023-12-16 DIAGNOSIS — R00.2 PALPITATIONS: ICD-10-CM

## 2023-12-16 LAB
ALBUMIN SERPL-MCNC: 4.1 G/DL (ref 3.5–5.2)
ALBUMIN/GLOB SERPL: 1 {RATIO} (ref 1–2.5)
ALP SERPL-CCNC: 45 U/L (ref 40–129)
ALT SERPL-CCNC: 32 U/L (ref 10–50)
ANION GAP SERPL CALCULATED.3IONS-SCNC: 7 MMOL/L (ref 9–16)
AST SERPL-CCNC: 32 U/L (ref 10–50)
BASOPHILS # BLD: 0 K/UL (ref 0–0.2)
BASOPHILS NFR BLD: 0 % (ref 0–2)
BILIRUB SERPL-MCNC: 0.2 MG/DL (ref 0–1.2)
BUN SERPL-MCNC: 13 MG/DL (ref 8–23)
CALCIUM SERPL-MCNC: 9.1 MG/DL (ref 8.6–10.4)
CHLORIDE SERPL-SCNC: 106 MMOL/L (ref 98–107)
CHOLEST SERPL-MCNC: 149 MG/DL (ref 0–199)
CHOLESTEROL/HDL RATIO: 4
CO2 SERPL-SCNC: 28 MMOL/L (ref 20–31)
CREAT SERPL-MCNC: 0.9 MG/DL (ref 0.7–1.2)
EOSINOPHIL # BLD: 0.05 K/UL (ref 0–0.4)
EOSINOPHILS RELATIVE PERCENT: 2 % (ref 1–4)
ERYTHROCYTE [DISTWIDTH] IN BLOOD BY AUTOMATED COUNT: 13.9 % (ref 11.8–14.4)
GFR SERPL CREATININE-BSD FRML MDRD: >60 ML/MIN/1.73M2
GLUCOSE SERPL-MCNC: 100 MG/DL (ref 74–99)
HCT VFR BLD AUTO: 34.8 % (ref 40.7–50.3)
HDLC SERPL-MCNC: 40 MG/DL
HGB BLD-MCNC: 10.8 G/DL (ref 13–17)
IMM GRANULOCYTES # BLD AUTO: 0.02 K/UL (ref 0–0.3)
IMM GRANULOCYTES NFR BLD: 1 %
LDLC SERPL CALC-MCNC: 96 MG/DL (ref 0–100)
LYMPHOCYTES NFR BLD: 1.01 K/UL (ref 1–4.8)
LYMPHOCYTES RELATIVE PERCENT: 44 % (ref 24–44)
MAGNESIUM SERPL-MCNC: 2.1 MG/DL (ref 1.6–2.4)
MCH RBC QN AUTO: 33.9 PG (ref 25.2–33.5)
MCHC RBC AUTO-ENTMCNC: 31 G/DL (ref 28.4–34.8)
MCV RBC AUTO: 109.1 FL (ref 82.6–102.9)
MONOCYTES NFR BLD: 0.25 K/UL (ref 0.1–0.8)
MONOCYTES NFR BLD: 11 % (ref 1–7)
MORPHOLOGY: ABNORMAL
NEUTROPHILS NFR BLD: 42 % (ref 36–66)
NEUTS SEG NFR BLD: 0.97 K/UL (ref 1.8–7.7)
NRBC BLD-RTO: 0 PER 100 WBC
PLATELET # BLD AUTO: 145 K/UL (ref 138–453)
PMV BLD AUTO: 9.5 FL (ref 8.1–13.5)
POTASSIUM SERPL-SCNC: 4.7 MMOL/L (ref 3.7–5.3)
PROT SERPL-MCNC: 7.2 G/DL (ref 6.6–8.7)
RBC # BLD AUTO: 3.19 M/UL (ref 4.21–5.77)
SODIUM SERPL-SCNC: 141 MMOL/L (ref 136–145)
TRIGL SERPL-MCNC: 68 MG/DL (ref 0–149)
TSH SERPL DL<=0.05 MIU/L-ACNC: 2.62 UIU/ML (ref 0.27–4.2)
VLDLC SERPL CALC-MCNC: 14 MG/DL
WBC OTHER # BLD: 2.3 K/UL (ref 3.5–11.3)

## 2023-12-16 PROCEDURE — 36415 COLL VENOUS BLD VENIPUNCTURE: CPT

## 2023-12-16 PROCEDURE — 73070 X-RAY EXAM OF ELBOW: CPT

## 2023-12-16 PROCEDURE — 83735 ASSAY OF MAGNESIUM: CPT

## 2023-12-16 PROCEDURE — 73030 X-RAY EXAM OF SHOULDER: CPT

## 2023-12-16 PROCEDURE — 73521 X-RAY EXAM HIPS BI 2 VIEWS: CPT

## 2023-12-16 PROCEDURE — 80053 COMPREHEN METABOLIC PANEL: CPT

## 2023-12-16 PROCEDURE — 84443 ASSAY THYROID STIM HORMONE: CPT

## 2023-12-16 PROCEDURE — 80061 LIPID PANEL: CPT

## 2023-12-16 PROCEDURE — 85025 COMPLETE CBC W/AUTO DIFF WBC: CPT

## 2023-12-22 ENCOUNTER — TELEPHONE (OUTPATIENT)
Age: 58
End: 2023-12-22

## 2023-12-22 DIAGNOSIS — D61.818 PANCYTOPENIA (HCC): Primary | ICD-10-CM

## 2023-12-22 NOTE — TELEPHONE ENCOUNTER
I spoke with patient today over the phone. We discussed his lab results which were concerning for worsening anemia. We discussed referring him over to hematology/oncology at [facility] with Dr. Natalee Palacios. He was in agreement with this. After speaking with Dr. Natalee Palacios we will be ordering additional labs which include:  Peripheral blood smear, B12, folate, copper, zinc, LDH, and haptoglobin.

## 2023-12-26 NOTE — TELEPHONE ENCOUNTER
Patient notified and he will come to our lab to have them drawn this week. He will hold his vitamins the day of the lab draw.

## 2023-12-27 ENCOUNTER — HOSPITAL ENCOUNTER (OUTPATIENT)
Age: 58
Setting detail: SPECIMEN
Discharge: HOME | End: 2023-12-27

## 2023-12-27 DIAGNOSIS — D61.818 PANCYTOPENIA (HCC): ICD-10-CM

## 2023-12-27 LAB
BASOPHILS # BLD: <0.03 K/UL (ref 0–0.2)
BASOPHILS NFR BLD: 0 % (ref 0–2)
EOSINOPHIL # BLD: 0.04 K/UL (ref 0–0.44)
EOSINOPHILS RELATIVE PERCENT: 2 % (ref 1–4)
ERYTHROCYTE [DISTWIDTH] IN BLOOD BY AUTOMATED COUNT: 13.9 % (ref 11.8–14.4)
FOLATE SERPL-MCNC: 10.5 NG/ML (ref 4.8–24.2)
HAPTOGLOB SERPL-MCNC: 93 MG/DL (ref 30–200)
HCT VFR BLD AUTO: 34.6 % (ref 40.7–50.3)
HGB BLD-MCNC: 10.8 G/DL (ref 13–17)
IMM GRANULOCYTES # BLD AUTO: 0.05 K/UL (ref 0–0.3)
IMM GRANULOCYTES NFR BLD: 2 %
IMM RETICS NFR: 32.4 % (ref 2.7–18.3)
LDH SERPL-CCNC: 203 U/L (ref 135–225)
LYMPHOCYTES NFR BLD: 1.05 K/UL (ref 1.1–3.7)
LYMPHOCYTES RELATIVE PERCENT: 38 % (ref 24–43)
MCH RBC QN AUTO: 33.6 PG (ref 25.2–33.5)
MCHC RBC AUTO-ENTMCNC: 31.2 G/DL (ref 28.4–34.8)
MCV RBC AUTO: 107.8 FL (ref 82.6–102.9)
MONOCYTES NFR BLD: 0.29 K/UL (ref 0.1–1.2)
MONOCYTES NFR BLD: 11 % (ref 3–12)
NEUTROPHILS NFR BLD: 48 % (ref 36–65)
NEUTS SEG NFR BLD: 1.32 K/UL (ref 1.5–8.1)
NRBC BLD-RTO: 0 PER 100 WBC
PLATELET # BLD AUTO: ABNORMAL K/UL (ref 138–453)
PLATELET, FLUORESCENCE: 160 K/UL (ref 138–453)
PLATELETS.RETICULATED NFR BLD AUTO: 2.4 % (ref 1.1–10.3)
RBC # BLD AUTO: 3.21 M/UL (ref 4.21–5.77)
RBC # BLD: ABNORMAL 10*6/UL
RETIC HEMOGLOBIN: 33.7 PG (ref 28.2–35.7)
RETICS # AUTO: 0.09 M/UL (ref 0.03–0.08)
RETICS/RBC NFR AUTO: 2.7 % (ref 0.5–1.9)
VIT B12 SERPL-MCNC: 1673 PG/ML (ref 232–1245)
WBC OTHER # BLD: 2.8 K/UL (ref 3.5–11.3)

## 2023-12-28 LAB
PATH REV BLD -IMP: NORMAL
SURGICAL PATHOLOGY REPORT: NORMAL
ZINC SERPL-MCNC: 84.2 UG/DL (ref 60–120)

## 2023-12-29 LAB — COPPER SERPL-MCNC: 72.4 UG/DL (ref 70–140)

## 2024-01-03 ENCOUNTER — OFFICE VISIT (OUTPATIENT)
Age: 59
End: 2024-01-03
Payer: COMMERCIAL

## 2024-01-03 VITALS — BODY MASS INDEX: 29.72 KG/M2 | WEIGHT: 239 LBS | HEIGHT: 75 IN

## 2024-01-03 DIAGNOSIS — S52.124A CLOSED NONDISPLACED FRACTURE OF HEAD OF RIGHT RADIUS, INITIAL ENCOUNTER: Primary | ICD-10-CM

## 2024-01-03 PROCEDURE — 99204 OFFICE O/P NEW MOD 45 MIN: CPT | Performed by: ORTHOPAEDIC SURGERY

## 2024-01-03 NOTE — PROGRESS NOTES
[facility]      [facility] PHYSICIANS [facility], [facility]  [facility] ORTHOPAEDICS [facility] SPORTS MEDICINE  [address]  [place] [address]  Dept: [phone]  Dept Fax: [phone]    Chief Compliant:  Chief Complaint   Patient presents with    Elbow Pain     R elbow        History of Present Illness:  This is a 58 y.o. male who presents to the clinic today for evaluation / follow up of right elbow pain.  He is a  by trade he states has had this pain for a couple months.  It is feeling better since he has been off work.  He does not return to work for another month..       Physical Exam:    On examination he has full elbow range of motion he has some tenderness over the radial head he has full pronation supination with no real crepitus some discomfort there as I palpate the radial head.  No effusion on the elbow skin is intact he is neurologically intact distally there is no instability    Nursing note and vitals reviewed.     Labs and Imaging: X-rays of the right elbow reviewed and I do see an intra-articular subacute fracture of the radial head.  There is some arthritis in the elbow as well but I do think this fracture is not chronic but more in the subacute phase.    XR taken today:  No results found.      No orders of the defined types were placed in this encounter.      Assessment and Plan:  No diagnosis found.      This is a 58 y.o. male with right elbow radial head fracture.  At this point I think that this is healing and I think with his time off work certainly is what he needed.  I would expect this to feel better.  He does not recall any trauma to the area.  Overall I suggested that as he gets back to work if he is having any troubles at that point we can see him back and do a steroid injection I think the fracture should be well-healed by that point.  We can see him back as needed.         Review of Systems   Constitutional:

## 2024-01-09 ENCOUNTER — OFFICE VISIT (OUTPATIENT)
Age: 59
End: 2024-01-09

## 2024-01-09 VITALS
DIASTOLIC BLOOD PRESSURE: 81 MMHG | WEIGHT: 249.8 LBS | BODY MASS INDEX: 31.22 KG/M2 | RESPIRATION RATE: 12 BRPM | SYSTOLIC BLOOD PRESSURE: 135 MMHG | HEART RATE: 70 BPM

## 2024-01-09 DIAGNOSIS — D70.9 NEUTROPENIA, UNSPECIFIED TYPE (HCC): ICD-10-CM

## 2024-01-09 DIAGNOSIS — S52.124S CLOSED NONDISPLACED FRACTURE OF HEAD OF RIGHT RADIUS, SEQUELA: ICD-10-CM

## 2024-01-09 DIAGNOSIS — E78.2 MIXED HYPERLIPIDEMIA: Primary | ICD-10-CM

## 2024-01-09 DIAGNOSIS — D53.9 MACROCYTIC ANEMIA: ICD-10-CM

## 2024-01-09 PROBLEM — D64.89 OTHER SPECIFIED ANEMIAS: Status: ACTIVE | Noted: 2024-01-09

## 2024-01-09 ASSESSMENT — PATIENT HEALTH QUESTIONNAIRE - PHQ9
2. FEELING DOWN, DEPRESSED OR HOPELESS: 0
SUM OF ALL RESPONSES TO PHQ QUESTIONS 1-9: 0
1. LITTLE INTEREST OR PLEASURE IN DOING THINGS: 0
SUM OF ALL RESPONSES TO PHQ9 QUESTIONS 1 & 2: 0
SUM OF ALL RESPONSES TO PHQ QUESTIONS 1-9: 0

## 2024-01-09 NOTE — PATIENT INSTRUCTIONS
Your recent lab suggest anemia.  Hemoglobin less than 13.0 in a male is anemia.  You also have very larger red blood cells.  The size was 109.1 and the upper limit is 102.9.      Thyroid B12 and Folic acid are normal scheduled to Hematologist or blood specialist 1/10 at 3:30 for his opinion in next step for evaluation.     Have reviewed the plan set by Dr. Ariza.    Will defer the plan for the abnormal red and white blood cells to the Hematologist.

## 2024-01-09 NOTE — PROGRESS NOTES
VASCULAR SURGERY      VEIN SURGERY Bilateral     legs        Social History     Socioeconomic History    Marital status:      Spouse name: None    Number of children: None    Years of education: None    Highest education level: None   Tobacco Use    Smoking status: Former     Current packs/day: 0.00     Average packs/day: 1 pack/day for 25.0 years (25.0 ttl pk-yrs)     Types: Cigarettes     Start date: 1978     Quit date: 2003     Years since quittin.0    Smokeless tobacco: Never   Vaping Use    Vaping Use: Never used   Substance and Sexual Activity    Alcohol use: Yes     Comment: occassional    Drug use: No     Social Determinants of Health     Financial Resource Strain: Low Risk  (12/15/2023)    Overall Financial Resource Strain (CARDIA)     Difficulty of Paying Living Expenses: Not hard at all   Food Insecurity: No Food Insecurity (12/15/2023)    Hunger Vital Sign     Worried About Running Out of Food in the Last Year: Never true     Ran Out of Food in the Last Year: Never true   Transportation Needs: Unknown (12/15/2023)    PRAPARE - Transportation     Lack of Transportation (Non-Medical): No   Housing Stability: Unknown (12/15/2023)    Housing Stability Vital Sign     Unstable Housing in the Last Year: No        PHYSICAL EXAM   /81 (Site: Left Upper Arm, Position: Sitting, Cuff Size: Large Adult)   Pulse 70   Resp 12   Wt 113.3 kg (249 lb 12.8 oz)   BMI 31.22 kg/m²    Physical Exam  Constitutional:       Appearance: Normal appearance.   HENT:      Head: Normocephalic and atraumatic.      Nose: Rhinorrhea present. No congestion.   Eyes:      General: No scleral icterus.     Extraocular Movements: Extraocular movements intact.      Conjunctiva/sclera: Conjunctivae normal.      Pupils: Pupils are equal, round, and reactive to light.   Cardiovascular:      Rate and Rhythm: Normal rate and regular rhythm.   Pulmonary:      Effort: Pulmonary effort is normal.      Breath sounds: Normal

## 2024-01-10 ENCOUNTER — TELEPHONE (OUTPATIENT)
Dept: ONCOLOGY | Age: 59
End: 2024-01-10

## 2024-01-10 ENCOUNTER — INITIAL CONSULT (OUTPATIENT)
Dept: ONCOLOGY | Age: 59
End: 2024-01-10
Payer: COMMERCIAL

## 2024-01-10 VITALS
WEIGHT: 247.7 LBS | BODY MASS INDEX: 30.8 KG/M2 | DIASTOLIC BLOOD PRESSURE: 72 MMHG | TEMPERATURE: 96.5 F | RESPIRATION RATE: 20 BRPM | HEART RATE: 69 BPM | HEIGHT: 75 IN | SYSTOLIC BLOOD PRESSURE: 136 MMHG | OXYGEN SATURATION: 96 %

## 2024-01-10 DIAGNOSIS — D70.9 NEUTROPENIA, UNSPECIFIED TYPE (HCC): ICD-10-CM

## 2024-01-10 DIAGNOSIS — D53.9 MACROCYTIC ANEMIA: Primary | ICD-10-CM

## 2024-01-10 PROCEDURE — 99202 OFFICE O/P NEW SF 15 MIN: CPT | Performed by: INTERNAL MEDICINE

## 2024-01-10 PROCEDURE — 99205 OFFICE O/P NEW HI 60 MIN: CPT | Performed by: INTERNAL MEDICINE

## 2024-01-10 NOTE — TELEPHONE ENCOUNTER
Labs and paradigm test , pt will come tomorrow  Rv in 3 weeks       Labs and si paradigm test soon    Rv scheduled for 1/31/24 @ 2:45pm    PT was given AVS and appt schedule    Electronically signed by Collette Sandhu on 1/10/2024 at 4:20 PM

## 2024-01-10 NOTE — PROGRESS NOTES
Fabrice Cruz Sr.                                                                                                                  1/10/2024  MRN:   [id number]  YOB: 1965  PCP:                           Chuy Clemente MD  Referring Physician: No ref. provider found  Treating Physician Name: JOYCE SHELBY MD      Reason for consultation:  Chief Complaint   Patient presents with    Consultation     Ref:Dr. Awadalla-Pancytopenia   Presents to the clinic to establish care and for further workup and evaluation of leukopenia and macrocytic anemia.    Current problems:  Leukopenia with mild neutropenia  Macrocytic anemia  Elevated B12    Active and recent treatments:  Workup in progress including NGS testing    Summary of Case/History:  Fabrice Cruz Sr. a 58 y.o.male is a patient with longstanding history of leukopenia presents to the clinic to establish care and for further workup and evaluation.  Patient has longstanding history of [place] which has been worked in the past patient also has been to [facility] however workup did not reveal any particular etiology of the leukopenia.  Patient denies any recurrent infections.  More recently patient also has developed macrocytic anemia patient's hemoglobin was 12.4 with MCV of 93 back in February 2018.  Patient CBC from December 2023 shows hemoglobin of 10.8 with MCV of 109.  Patient denies any heavy alcohol consumption.  Patient has had bone marrow biopsy done in the past we did not reveal any etiology of the leukopenia.  Denies any history of liver disease.  Patient denies any weight loss drenching night sweats excessive fatigue or swollen glands.  CT abdomen pelvis from 2017 did not reveal any acute intra-abdominal process.    Past Medical History:   Past Medical History:   Diagnosis Date    Arthritis     Heartburn     Hyperlipidemia        Past Surgical History:     Past Surgical History:   Procedure Laterality Date

## 2024-01-11 ENCOUNTER — HOSPITAL ENCOUNTER (OUTPATIENT)
Age: 59
Discharge: HOME | End: 2024-01-11
Payer: COMMERCIAL

## 2024-01-11 ENCOUNTER — TELEPHONE (OUTPATIENT)
Dept: INFUSION THERAPY | Age: 59
End: 2024-01-11

## 2024-01-11 DIAGNOSIS — D53.9 MACROCYTIC ANEMIA: ICD-10-CM

## 2024-01-11 LAB
BASOPHILS # BLD: 0 K/UL (ref 0–0.2)
BASOPHILS NFR BLD: 1 % (ref 0–2)
EOSINOPHIL # BLD: 0.1 K/UL (ref 0–0.4)
EOSINOPHILS RELATIVE PERCENT: 3 % (ref 1–4)
ERYTHROCYTE [DISTWIDTH] IN BLOOD BY AUTOMATED COUNT: 14.7 % (ref 12.5–15.4)
FERRITIN SERPL-MCNC: 181 NG/ML (ref 30–400)
HCT VFR BLD AUTO: 33.3 % (ref 41–53)
HGB BLD-MCNC: 11 G/DL (ref 13.5–17.5)
IRON SATN MFR SERPL: 32 % (ref 20–55)
IRON SERPL-MCNC: 94 UG/DL (ref 59–158)
LYMPHOCYTES NFR BLD: 1.1 K/UL (ref 1–4.8)
LYMPHOCYTES RELATIVE PERCENT: 39 % (ref 24–44)
MCH RBC QN AUTO: 34.2 PG (ref 26–34)
MCHC RBC AUTO-ENTMCNC: 33.1 G/DL (ref 31–37)
MCV RBC AUTO: 103.3 FL (ref 80–100)
MONOCYTES NFR BLD: 0.3 K/UL (ref 0.1–1.2)
MONOCYTES NFR BLD: 12 % (ref 2–11)
NEUTROPHILS NFR BLD: 45 % (ref 36–66)
NEUTS SEG NFR BLD: 1.2 K/UL (ref 1.8–7.7)
PLATELET # BLD AUTO: 156 K/UL (ref 140–450)
PMV BLD AUTO: 6.9 FL (ref 6–12)
RBC # BLD AUTO: 3.22 M/UL (ref 4.5–5.9)
SEND OUT REPORT: NORMAL
TEST NAME: NORMAL
TIBC SERPL-MCNC: 294 UG/DL (ref 250–450)
UNSATURATED IRON BINDING CAPACITY: 200 UG/DL (ref 112–347)
WBC OTHER # BLD: 2.7 K/UL (ref 3.5–11)

## 2024-01-11 PROCEDURE — 86225 DNA ANTIBODY NATIVE: CPT

## 2024-01-11 PROCEDURE — 36415 COLL VENOUS BLD VENIPUNCTURE: CPT

## 2024-01-11 PROCEDURE — 82728 ASSAY OF FERRITIN: CPT

## 2024-01-11 PROCEDURE — 86038 ANTINUCLEAR ANTIBODIES: CPT

## 2024-01-11 PROCEDURE — 85025 COMPLETE CBC W/AUTO DIFF WBC: CPT

## 2024-01-11 PROCEDURE — 83550 IRON BINDING TEST: CPT

## 2024-01-11 PROCEDURE — 83540 ASSAY OF IRON: CPT

## 2024-01-14 LAB
ANA SER QL IA: NEGATIVE
DSDNA IGG SER QL IA: 1.4 IU/ML
NUCLEAR IGG SER IA-RTO: 0.3 U/ML

## 2024-01-15 ENCOUNTER — OFFICE VISIT (OUTPATIENT)
Dept: PODIATRY | Age: 59
End: 2024-01-15
Payer: COMMERCIAL

## 2024-01-15 VITALS — HEIGHT: 75 IN | BODY MASS INDEX: 30.71 KG/M2 | WEIGHT: 247 LBS

## 2024-01-15 DIAGNOSIS — Z74.09 PROLONGED IMMOBILIZATION: ICD-10-CM

## 2024-01-15 DIAGNOSIS — M79.671 PAIN IN RIGHT FOOT: ICD-10-CM

## 2024-01-15 DIAGNOSIS — M76.71 PERONEAL TENDINITIS OF RIGHT LOWER EXTREMITY: Primary | ICD-10-CM

## 2024-01-15 DIAGNOSIS — Z91.89 AT HIGH RISK FOR DEEP VENOUS THROMBOSIS: ICD-10-CM

## 2024-01-15 DIAGNOSIS — R60.0 EDEMA, LOWER EXTREMITY: ICD-10-CM

## 2024-01-15 PROCEDURE — 99213 OFFICE O/P EST LOW 20 MIN: CPT | Performed by: PODIATRIST

## 2024-01-15 PROCEDURE — L4360 PNEUMAT WALKING BOOT PRE CST: HCPCS | Performed by: PODIATRIST

## 2024-01-15 RX ORDER — RIVAROXABAN 10 MG/1
10 TABLET, FILM COATED ORAL
Qty: 30 TABLET | Refills: 1 | Status: SHIPPED | OUTPATIENT
Start: 2024-01-15

## 2024-01-15 ASSESSMENT — ENCOUNTER SYMPTOMS
NAUSEA: 0
SHORTNESS OF BREATH: 0
COLOR CHANGE: 0
BACK PAIN: 0
DIARRHEA: 0

## 2024-01-15 NOTE — PROGRESS NOTES
[facility] Podiatry  Return Patient Progress Note    Subjective: Fabrice Cruz Sr. 58 y.o. male that presents with chief complaint of pain to outside of the right foot.   Chief Complaint   Patient presents with    Foot Pain     Right foot painful and inflammed    Patient states that this has been present since October of last year. Patient states that he was diagnosed with a tear to the plantar fasciitis of the right foot and the outside of the right foot has been painful ever since.  Pain is rated 10 out of 10 and is described as intermittent. Patient denies any treatment for this prior to today.     Review of Systems   Constitutional:  Negative for activity change, appetite change, chills, diaphoresis, fatigue and fever.   Respiratory:  Negative for shortness of breath.    Cardiovascular:  Negative for leg swelling.   Gastrointestinal:  Negative for diarrhea and nausea.   Endocrine: Negative for cold intolerance, heat intolerance and polyuria.   Musculoskeletal:  Positive for arthralgias. Negative for back pain, gait problem, joint swelling and myalgias.   Skin:  Negative for color change, pallor, rash and wound.   Allergic/Immunologic: Negative for environmental allergies and food allergies.   Neurological:  Negative for dizziness, weakness, light-headedness and numbness.   Hematological:  Does not bruise/bleed easily.   Psychiatric/Behavioral:  Negative for behavioral problems, confusion and self-injury. The patient is not nervous/anxious.        Objective: Clinical evaluation of the patient reveals pain with palpation to the styloid process of the right fifth metatarsal.  There is pain with palpation to the peroneal brevis tendon just proximal to the styloid process of the right foot.  There is no pain with palpation to the peroneal brevis tendon posterior to the lateral malleolus of the right lower extremity.  There is mild pain with palpation to the shaft of the fifth metatarsal of the right foot.

## 2024-01-16 ENCOUNTER — HOSPITAL ENCOUNTER (OUTPATIENT)
Dept: ULTRASOUND IMAGING | Age: 59
Discharge: HOME | End: 2024-01-18
Payer: COMMERCIAL

## 2024-01-16 DIAGNOSIS — D53.9 MACROCYTIC ANEMIA: ICD-10-CM

## 2024-01-16 PROCEDURE — 76705 ECHO EXAM OF ABDOMEN: CPT

## 2024-01-20 PROBLEM — D53.9 MACROCYTIC ANEMIA: Status: ACTIVE | Noted: 2024-01-09

## 2024-01-20 PROBLEM — D70.9 NEUTROPENIA (HCC): Status: ACTIVE | Noted: 2024-01-20

## 2024-01-20 PROBLEM — S52.124A CLOSED NONDISPLACED FRACTURE OF HEAD OF RIGHT RADIUS: Status: ACTIVE | Noted: 2024-01-20

## 2024-01-20 ASSESSMENT — ENCOUNTER SYMPTOMS
TROUBLE SWALLOWING: 0
SORE THROAT: 0
SINUS PRESSURE: 0
VOICE CHANGE: 0
RHINORRHEA: 0

## 2024-01-20 NOTE — ASSESSMENT & PLAN NOTE
Patient is scheduled for consultation with hematology oncology will defer to their management and follow as a distance.

## 2024-01-20 NOTE — ASSESSMENT & PLAN NOTE
Continue the current moderate to high dose statin intervention with Crestor 20 mg daily will repeat the lipid panel and liver enzymes approximately 6 months.

## 2024-01-20 NOTE — ASSESSMENT & PLAN NOTE
Continue treatment plan per Dr. Ariza.  Gentle range of motion over time should be effective.  Will defer to his judgment.

## 2024-01-31 ENCOUNTER — TELEPHONE (OUTPATIENT)
Dept: ONCOLOGY | Age: 59
End: 2024-01-31

## 2024-01-31 ENCOUNTER — OFFICE VISIT (OUTPATIENT)
Dept: ONCOLOGY | Age: 59
End: 2024-01-31
Payer: COMMERCIAL

## 2024-01-31 VITALS
SYSTOLIC BLOOD PRESSURE: 126 MMHG | BODY MASS INDEX: 31.04 KG/M2 | TEMPERATURE: 97.5 F | WEIGHT: 248.3 LBS | DIASTOLIC BLOOD PRESSURE: 82 MMHG | HEART RATE: 63 BPM

## 2024-01-31 DIAGNOSIS — D53.9 MACROCYTIC ANEMIA: Primary | ICD-10-CM

## 2024-01-31 DIAGNOSIS — D46.9 MDS (MYELODYSPLASTIC SYNDROME) (HCC): ICD-10-CM

## 2024-01-31 PROCEDURE — 99211 OFF/OP EST MAY X REQ PHY/QHP: CPT | Performed by: INTERNAL MEDICINE

## 2024-01-31 PROCEDURE — 99214 OFFICE O/P EST MOD 30 MIN: CPT | Performed by: INTERNAL MEDICINE

## 2024-01-31 NOTE — PROGRESS NOTES
mineralization and alignment appear intact.  Minor degenerative changes of the glenohumeral joint and AC joint.  Narrowing of the cervical joint space suggests rotator cuff arthropathy.  MRI could assess the rotator cuff if clinically warranted. Left shoulder: No evidence of acute fracture or dislocation.  Bone mineralization and alignment appear intact.  Minor degenerative changes of the left shoulder including the AC joint.  Slight narrowing of the subacromial joint space and subtle cortical irregularity of the greater tuberosity could indicate rotator cuff arthropathy.  MRI could assess the rotator cuff if clinically warranted. Left elbow: No evidence of acute fracture.  Mild to moderate degenerative changes of the left elbow.  Subtle deformity of the radial head appears similar to the previous MRI and is probably degenerative and/or due to remote trauma given lack of joint effusion and no known recent injury.  There is no elevation of the anterior fat pad to suggest joint effusion or hemarthrosis. Spurring of the olecranon and medial epicondyle.  A couple of tiny well corticated ossific opacities by the lateral epicondyle are likely degenerative or due to remote trauma. Right elbow: Mild degenerative changes without convincing evidence of acute fracture or dislocation.  No elevation of the anterior fat pad to suggest significant joint effusion.  Enthesopathic spurring of the medial epicondyle and olecranon.  Bone mineralization and alignment appear intact.     Bilateral elbows: Degenerative changes with enthesopathic spurring of the olecranon and epicondyles.  Subtle deformity left radial head is likely due to remote trauma and/or degenerative. Bilateral shoulders: Minor degenerative changes.  Radiographic findings suggesting rotator cuff arthropathy. AP pelvis and bilateral hips: Mild degenerative changes of the hips.     XR SHOULDER LEFT (MIN 2 VIEWS)    Result Date: 12/18/2023  EXAMINATION: 3 XRAY VIEWS OF

## 2024-01-31 NOTE — TELEPHONE ENCOUNTER
Bone marroe biopsy   Rv 2-3 weeks after biopsy       Biopsy scheduled @ [facility] @ 10:00am    RV scheduled for 2/23/24 @ 3:00pm    PT was given AVS and appt schedule    Electronically signed by Collette Sandhu on 1/31/2024 at 3:51 PM

## 2024-02-13 ENCOUNTER — HOSPITAL ENCOUNTER (OUTPATIENT)
Dept: CT IMAGING | Age: 59
Discharge: HOME | End: 2024-02-15
Payer: COMMERCIAL

## 2024-02-13 VITALS
SYSTOLIC BLOOD PRESSURE: 129 MMHG | HEART RATE: 62 BPM | BODY MASS INDEX: 31.83 KG/M2 | TEMPERATURE: 97.3 F | OXYGEN SATURATION: 96 % | DIASTOLIC BLOOD PRESSURE: 85 MMHG | HEIGHT: 74 IN | RESPIRATION RATE: 18 BRPM | WEIGHT: 248 LBS

## 2024-02-13 DIAGNOSIS — D53.9 MACROCYTIC ANEMIA: ICD-10-CM

## 2024-02-13 LAB
BASOPHILS # BLD: 0.02 K/UL (ref 0–0.2)
BASOPHILS NFR BLD: 1 % (ref 0–2)
EOSINOPHIL # BLD: 0.05 K/UL (ref 0–0.44)
EOSINOPHILS RELATIVE PERCENT: 2 % (ref 1–4)
ERYTHROCYTE [DISTWIDTH] IN BLOOD BY AUTOMATED COUNT: 14.3 % (ref 11.8–14.4)
HCT VFR BLD AUTO: 36.5 % (ref 40.7–50.3)
HGB BLD-MCNC: 10.9 G/DL (ref 13–17)
IMM GRANULOCYTES # BLD AUTO: 0.05 K/UL (ref 0–0.3)
IMM GRANULOCYTES NFR BLD: 2 %
IMM RETICS NFR: 36.6 % (ref 2.7–18.3)
INR PPP: 1
LYMPHOCYTES NFR BLD: 1.03 K/UL (ref 1.1–3.7)
LYMPHOCYTES RELATIVE PERCENT: 45 % (ref 24–43)
MCH RBC QN AUTO: 33.5 PG (ref 25.2–33.5)
MCHC RBC AUTO-ENTMCNC: 29.9 G/DL (ref 28–38)
MCV RBC AUTO: 112.3 FL (ref 82.6–102.9)
MONOCYTES NFR BLD: 0.28 K/UL (ref 0.1–1.2)
MONOCYTES NFR BLD: 12 % (ref 3–12)
MORPHOLOGY: ABNORMAL
NEUTROPHILS NFR BLD: 38 % (ref 36–65)
NEUTS SEG NFR BLD: 0.87 K/UL (ref 1.5–8.1)
PARTIAL THROMBOPLASTIN TIME: 31.5 SEC (ref 23.9–33.8)
PLATELET # BLD AUTO: 137 K/UL (ref 138–453)
PMV BLD AUTO: 9.5 FL (ref 8.1–13.5)
PROTHROMBIN TIME: 13.1 SEC (ref 11.5–14.2)
RBC # BLD AUTO: 3.25 M/UL (ref 4.21–5.77)
RETIC HEMOGLOBIN: 30.5 PG (ref 28.2–35.7)
RETICS # AUTO: 0.09 M/UL (ref 0.03–0.08)
RETICS/RBC NFR AUTO: 2.9 % (ref 0.5–1.9)
WBC OTHER # BLD: 2.3 K/UL (ref 3.5–11.3)

## 2024-02-13 PROCEDURE — 88275 CYTOGENETICS 100-300: CPT

## 2024-02-13 PROCEDURE — 88311 DECALCIFY TISSUE: CPT

## 2024-02-13 PROCEDURE — 88342 IMHCHEM/IMCYTCHM 1ST ANTB: CPT

## 2024-02-13 PROCEDURE — 6360000002 HC RX W HCPCS: Performed by: RADIOLOGY

## 2024-02-13 PROCEDURE — 88360 TUMOR IMMUNOHISTOCHEM/MANUAL: CPT

## 2024-02-13 PROCEDURE — 85730 THROMBOPLASTIN TIME PARTIAL: CPT

## 2024-02-13 PROCEDURE — 85045 AUTOMATED RETICULOCYTE COUNT: CPT

## 2024-02-13 PROCEDURE — 77012 CT SCAN FOR NEEDLE BIOPSY: CPT

## 2024-02-13 PROCEDURE — 88264 CHROMOSOME ANALYSIS 20-25: CPT

## 2024-02-13 PROCEDURE — 88237 TISSUE CULTURE BONE MARROW: CPT

## 2024-02-13 PROCEDURE — 88305 TISSUE EXAM BY PATHOLOGIST: CPT

## 2024-02-13 PROCEDURE — 88313 SPECIAL STAINS GROUP 2: CPT

## 2024-02-13 PROCEDURE — 85025 COMPLETE CBC W/AUTO DIFF WBC: CPT

## 2024-02-13 PROCEDURE — 88271 CYTOGENETICS DNA PROBE: CPT

## 2024-02-13 PROCEDURE — 85610 PROTHROMBIN TIME: CPT

## 2024-02-13 PROCEDURE — 7100000010 HC PHASE II RECOVERY - FIRST 15 MIN: Performed by: RADIOLOGY

## 2024-02-13 PROCEDURE — 7100000011 HC PHASE II RECOVERY - ADDTL 15 MIN: Performed by: RADIOLOGY

## 2024-02-13 PROCEDURE — 88280 CHROMOSOME KARYOTYPE STUDY: CPT

## 2024-02-13 RX ORDER — MIDAZOLAM HYDROCHLORIDE 5 MG/ML
INJECTION INTRAMUSCULAR; INTRAVENOUS PRN
Status: COMPLETED | OUTPATIENT
Start: 2024-02-13 | End: 2024-02-13

## 2024-02-13 RX ORDER — SODIUM CHLORIDE 9 MG/ML
INJECTION, SOLUTION INTRAVENOUS PRN
Status: DISCONTINUED | OUTPATIENT
Start: 2024-02-13 | End: 2024-02-16 | Stop reason: HOSPADM

## 2024-02-13 RX ORDER — SODIUM CHLORIDE 0.9 % (FLUSH) 0.9 %
5-40 SYRINGE (ML) INJECTION PRN
Status: DISCONTINUED | OUTPATIENT
Start: 2024-02-13 | End: 2024-02-16 | Stop reason: HOSPADM

## 2024-02-13 RX ORDER — SODIUM CHLORIDE 0.9 % (FLUSH) 0.9 %
5-40 SYRINGE (ML) INJECTION ONCE
Status: DISCONTINUED | OUTPATIENT
Start: 2024-02-13 | End: 2024-02-16 | Stop reason: HOSPADM

## 2024-02-13 RX ORDER — FENTANYL CITRATE 0.05 MG/ML
INJECTION, SOLUTION INTRAMUSCULAR; INTRAVENOUS PRN
Status: COMPLETED | OUTPATIENT
Start: 2024-02-13 | End: 2024-02-13

## 2024-02-13 RX ADMIN — MIDAZOLAM HYDROCHLORIDE 1 MG: 5 INJECTION INTRAMUSCULAR; INTRAVENOUS at 10:42

## 2024-02-13 RX ADMIN — MIDAZOLAM HYDROCHLORIDE 1 MG: 5 INJECTION INTRAMUSCULAR; INTRAVENOUS at 10:34

## 2024-02-13 RX ADMIN — FENTANYL CITRATE 50 MCG: 0.05 INJECTION, SOLUTION INTRAMUSCULAR; INTRAVENOUS at 10:41

## 2024-02-13 RX ADMIN — FENTANYL CITRATE 50 MCG: 0.05 INJECTION, SOLUTION INTRAMUSCULAR; INTRAVENOUS at 10:34

## 2024-02-13 NOTE — PRE SEDATION
Sedation Pre-Procedure Note    Patient Name: Fabrice Cruz Sr.   YOB: 1965  Room/Bed: Room/bed info not found  Medical Record Number: [id number]  Date: 2/13/2024   Time: 10:16 AM       Indication:  Work up for MDS    Consent: I have discussed with the patient and/or the patient representative the indication, alternatives, and the possible risks and/or complications of the planned procedure and the anesthesia methods. The patient and/or patient representative appear to understand and agree to proceed.    Vital Signs:   Vitals:    02/13/24 0918   BP: (!) 132/95   Pulse: 61   Temp:    SpO2:        Past Medical History:   has a past medical history of Arthritis, Heartburn, Hyperlipidemia, and Plantar fasciitis, bilateral.    Past Surgical History:   has a past surgical history that includes Vein Surgery (Bilateral); Elbow surgery (Right); Skin graft; Tonsillectomy; pr excision tumor soft tissue back/flank subq <3cm (N/A, 12/1/2017); pr colon ca scrn not hi rsk ind (N/A, 12/15/2017); Colonoscopy; hernia repair (Bilateral, 2006); vascular surgery; and Colonoscopy (N/A, 2/19/2018).    Medications:   Scheduled Meds:    sodium chloride flush  5-40 mL IntraVENous Once     Continuous Infusions:    sodium chloride       PRN Meds: sodium chloride flush, sodium chloride  Home Meds:   Prior to Admission medications    Medication Sig Start Date End Date Taking? Authorizing Provider   rivaroxaban (XARELTO) 10 MG TABS tablet Take 1 tablet by mouth daily (with breakfast)  Patient not taking: Reported on 2/13/2024 1/15/24   Freddy Esquivel, DPM   rosuvastatin (CRESTOR) 20 MG tablet Take 1 tablet by mouth daily 11/22/23   Chuy Clemente MD     Coumadin Use Last 7 Days:  no  Antiplatelet drug therapy use last 7 days: no  Other anticoagulant use last 7 days: no  Additional Medication Information:  see med rec      Pre-Sedation Documentation and Exam:   I have reviewed the patient's history and review of

## 2024-02-15 LAB — BONE MARROW REPORT: NORMAL

## 2024-02-16 ENCOUNTER — OFFICE VISIT (OUTPATIENT)
Dept: PODIATRY | Age: 59
End: 2024-02-16
Payer: COMMERCIAL

## 2024-02-16 VITALS — BODY MASS INDEX: 31.83 KG/M2 | WEIGHT: 248 LBS | HEIGHT: 74 IN

## 2024-02-16 DIAGNOSIS — M79.671 PAIN IN RIGHT FOOT: ICD-10-CM

## 2024-02-16 DIAGNOSIS — R60.0 EDEMA, LOWER EXTREMITY: ICD-10-CM

## 2024-02-16 DIAGNOSIS — M76.71 PERONEAL TENDINITIS OF RIGHT LOWER EXTREMITY: Primary | ICD-10-CM

## 2024-02-16 LAB — FLOW CYTOMETRY, BM: NORMAL

## 2024-02-16 PROCEDURE — 99213 OFFICE O/P EST LOW 20 MIN: CPT | Performed by: PODIATRIST

## 2024-02-16 NOTE — PROGRESS NOTES
[facility] Podiatry  Return Patient Progress Note    Subjective: Fabrice Cruz Sr. 58 y.o. male that presents for follow up evaluation of peroneal tendonitis right.   Chief Complaint   Patient presents with    Foot Pain     Follow up right foot,still having some pain     Patient's treatment thus far has included CAM walker, RICE.  Pain is rated 0 out of 10 and is described as none. Patient has been following my prescribed course of therapy as instructed. However, patient relates pain to the front of the ankle and to the inside of the right foot.    Review of Systems   Constitutional:  Negative for activity change, appetite change, chills, diaphoresis, fatigue and fever.   Respiratory:  Negative for shortness of breath.    Cardiovascular:  Negative for leg swelling.   Gastrointestinal:  Negative for diarrhea and nausea.   Endocrine: Negative for cold intolerance, heat intolerance and polyuria.   Musculoskeletal:  Positive for arthralgias. Negative for back pain, gait problem, joint swelling and myalgias.   Skin:  Negative for color change, pallor, rash and wound.   Allergic/Immunologic: Negative for environmental allergies and food allergies.   Neurological:  Negative for dizziness, weakness, light-headedness and numbness.   Hematological:  Does not bruise/bleed easily.   Psychiatric/Behavioral:  Negative for behavioral problems, confusion and self-injury. The patient is not nervous/anxious.        Objective: Clinical evaluation of the patient reveals only mild pain today with palpation to the styloid process of the right fifth metatarsal. There is no pain with palpation to the peroneal brevis tendon just proximal to the styloid process of the right foot. There is no pain with palpation to the peroneal brevis tendon posterior to the lateral malleolus of the right lower extremity. There is no pain with palpation to the shaft of the fifth metatarsal of the right foot. There is no edema noted to the right foot

## 2024-02-19 LAB — CHROMOSOME STUDY: NORMAL

## 2024-02-19 ASSESSMENT — ENCOUNTER SYMPTOMS
BACK PAIN: 0
SHORTNESS OF BREATH: 0
DIARRHEA: 0
COLOR CHANGE: 0
NAUSEA: 0

## 2024-02-20 RX ORDER — ROSUVASTATIN CALCIUM 20 MG/1
20 TABLET, COATED ORAL DAILY
Qty: 90 TABLET | Refills: 1 | Status: SHIPPED | OUTPATIENT
Start: 2024-02-20

## 2024-02-23 ENCOUNTER — OFFICE VISIT (OUTPATIENT)
Dept: ONCOLOGY | Age: 59
End: 2024-02-23
Payer: COMMERCIAL

## 2024-02-23 ENCOUNTER — TELEPHONE (OUTPATIENT)
Dept: ONCOLOGY | Age: 59
End: 2024-02-23

## 2024-02-23 VITALS
DIASTOLIC BLOOD PRESSURE: 87 MMHG | TEMPERATURE: 97.5 F | WEIGHT: 257.8 LBS | SYSTOLIC BLOOD PRESSURE: 135 MMHG | BODY MASS INDEX: 33.1 KG/M2 | HEART RATE: 67 BPM

## 2024-02-23 DIAGNOSIS — D53.9 MACROCYTIC ANEMIA: ICD-10-CM

## 2024-02-23 DIAGNOSIS — D70.9 NEUTROPENIA, UNSPECIFIED TYPE (HCC): ICD-10-CM

## 2024-02-23 DIAGNOSIS — D46.9 MDS (MYELODYSPLASTIC SYNDROME) (HCC): Primary | ICD-10-CM

## 2024-02-23 PROCEDURE — 99211 OFF/OP EST MAY X REQ PHY/QHP: CPT | Performed by: INTERNAL MEDICINE

## 2024-02-23 PROCEDURE — 99215 OFFICE O/P EST HI 40 MIN: CPT | Performed by: INTERNAL MEDICINE

## 2024-02-23 NOTE — PROGRESS NOTES
Fabrice Cruz Sr.                                                                                                                  2/23/2024  MRN:   [id number]  YOB: 1965  PCP:                           Chuy Clemente MD  Referring Physician: No ref. 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  Patient bone marrow biopsy came back consistent with MDS-EB 1.  Patient complains of being tired but it is manageable.  Denies recurrent infections.  Denies any spontaneous bruising or excessive bleeding.    During this visit patient's allergy, social, medical, surgical history and medications were reviewed and updated.    Summary of Case/History:  Fabrice Cruz Sr. a 58 y.o.male is a patient with longstanding history of leukopenia presents to the clinic to establish care and for further workup and evaluation.  Patient has longstanding history of [place] which has been worked in the past patient also has been to [facility] however workup did not reveal any particular etiology of the leukopenia.  Patient denies any recurrent infections.  More recently patient also has developed macrocytic anemia patient's hemoglobin was 12.4 with MCV of 93 back in February 2018.  Patient CBC from December 2023 shows hemoglobin of 10.8 with MCV of 109.  Patient denies any heavy alcohol consumption.  Patient has had bone marrow biopsy done in the past we did not reveal any etiology of the leukopenia.  Denies any

## 2024-02-23 NOTE — TELEPHONE ENCOUNTER
Rv in 2 months with labs           RV scheduled for 4/19/24 @ 349pm. Labs (cbc ldh) to be drawn at visit    An After Visit Summary was printed and given to the patient.    Electronically signed by Tiffani Kidd on 2/23/2024 at 3:43 PM

## 2024-02-26 RX ORDER — ROSUVASTATIN CALCIUM 20 MG/1
20 TABLET, COATED ORAL DAILY
Qty: 90 TABLET | Refills: 1 | Status: SHIPPED | OUTPATIENT
Start: 2024-02-26

## 2024-04-13 ENCOUNTER — HOSPITAL ENCOUNTER (OUTPATIENT)
Age: 59
Discharge: HOME | End: 2024-04-13
Payer: COMMERCIAL

## 2024-04-13 DIAGNOSIS — D46.9 MDS (MYELODYSPLASTIC SYNDROME) (HCC): ICD-10-CM

## 2024-04-13 DIAGNOSIS — D70.9 NEUTROPENIA, UNSPECIFIED TYPE (HCC): ICD-10-CM

## 2024-04-13 DIAGNOSIS — D53.9 MACROCYTIC ANEMIA: ICD-10-CM

## 2024-04-13 LAB
BASOPHILS # BLD: 0 K/UL (ref 0–0.2)
BASOPHILS NFR BLD: 0 % (ref 0–2)
BLASTS ABSOLUTE COUNT: 0.04 K/UL
BLASTS: 2 %
EOSINOPHIL # BLD: 0.04 K/UL (ref 0–0.4)
EOSINOPHILS RELATIVE PERCENT: 2 % (ref 1–4)
ERYTHROCYTE [DISTWIDTH] IN BLOOD BY AUTOMATED COUNT: 14.1 % (ref 11.8–14.4)
HCT VFR BLD AUTO: 35.2 % (ref 40.7–50.3)
HGB BLD-MCNC: 11.2 G/DL (ref 13–17)
IMM GRANULOCYTES # BLD AUTO: 0 K/UL (ref 0–0.3)
IMM GRANULOCYTES NFR BLD: 0 %
LDH SERPL-CCNC: 223 U/L (ref 135–225)
LYMPHOCYTES NFR BLD: 1.22 K/UL (ref 1–4.8)
LYMPHOCYTES RELATIVE PERCENT: 55 % (ref 24–44)
MCH RBC QN AUTO: 33.3 PG (ref 25.2–33.5)
MCHC RBC AUTO-ENTMCNC: 31.8 G/DL (ref 28.4–34.8)
MCV RBC AUTO: 104.8 FL (ref 82.6–102.9)
MONOCYTES NFR BLD: 0.04 K/UL (ref 0.1–0.8)
MONOCYTES NFR BLD: 2 % (ref 1–7)
MORPHOLOGY: ABNORMAL
NEUTROPHILS NFR BLD: 39 % (ref 36–66)
NEUTS SEG NFR BLD: 0.86 K/UL (ref 1.8–7.7)
NRBC BLD-RTO: 0 PER 100 WBC
NUCLEATED RED BLOOD CELLS: 1 PER 100 WBC
PLATELET # BLD AUTO: 141 K/UL (ref 138–453)
PMV BLD AUTO: 9.7 FL (ref 8.1–13.5)
RBC # BLD AUTO: 3.36 M/UL (ref 4.21–5.77)
WBC OTHER # BLD: 2.2 K/UL (ref 3.5–11.3)

## 2024-04-13 PROCEDURE — 83615 LACTATE (LD) (LDH) ENZYME: CPT

## 2024-04-13 PROCEDURE — 85025 COMPLETE CBC W/AUTO DIFF WBC: CPT

## 2024-04-13 PROCEDURE — 36415 COLL VENOUS BLD VENIPUNCTURE: CPT

## 2024-04-26 ENCOUNTER — TELEPHONE (OUTPATIENT)
Dept: ONCOLOGY | Age: 59
End: 2024-04-26

## 2024-04-26 ENCOUNTER — OFFICE VISIT (OUTPATIENT)
Dept: ONCOLOGY | Age: 59
End: 2024-04-26
Payer: COMMERCIAL

## 2024-04-26 VITALS
SYSTOLIC BLOOD PRESSURE: 151 MMHG | TEMPERATURE: 97.7 F | HEART RATE: 74 BPM | WEIGHT: 259.8 LBS | DIASTOLIC BLOOD PRESSURE: 90 MMHG | BODY MASS INDEX: 33.36 KG/M2

## 2024-04-26 DIAGNOSIS — D70.9 NEUTROPENIA, UNSPECIFIED TYPE (HCC): ICD-10-CM

## 2024-04-26 DIAGNOSIS — D53.9 MACROCYTIC ANEMIA: ICD-10-CM

## 2024-04-26 DIAGNOSIS — D46.9 MDS (MYELODYSPLASTIC SYNDROME) (HCC): Primary | ICD-10-CM

## 2024-04-26 PROCEDURE — 99214 OFFICE O/P EST MOD 30 MIN: CPT | Performed by: INTERNAL MEDICINE

## 2024-04-26 PROCEDURE — 99211 OFF/OP EST MAY X REQ PHY/QHP: CPT | Performed by: INTERNAL MEDICINE

## 2024-04-26 NOTE — TELEPHONE ENCOUNTER
AVS from 04/26/2024    RV in 2 months with labs    RV is scheduled for 06/28/2024   Pt is going to have labs drawn prior to rv(cbc,cmp)    PT was given AVS and appt schedule    Electronically signed by Rimma Gillespie on 4/26/2024 at 4:55 PM

## 2024-06-27 ENCOUNTER — HOSPITAL ENCOUNTER (OUTPATIENT)
Age: 59
Discharge: HOME | End: 2024-06-27
Payer: COMMERCIAL

## 2024-06-27 DIAGNOSIS — D46.9 MDS (MYELODYSPLASTIC SYNDROME) (HCC): ICD-10-CM

## 2024-06-27 DIAGNOSIS — D70.9 NEUTROPENIA, UNSPECIFIED TYPE (HCC): ICD-10-CM

## 2024-06-27 LAB
ANION GAP SERPL CALCULATED.3IONS-SCNC: 8 MMOL/L (ref 9–17)
ATYPICAL LYMPHOCYTE ABSOLUTE COUNT: 0.06 K/UL
ATYPICAL LYMPHOCYTES: 2 %
BASOPHILS # BLD: 0 K/UL (ref 0–0.2)
BASOPHILS NFR BLD: 0 % (ref 0–2)
BUN SERPL-MCNC: 20 MG/DL (ref 8–23)
CALCIUM SERPL-MCNC: 9.6 MG/DL (ref 8.6–10.4)
CHLORIDE SERPL-SCNC: 106 MMOL/L (ref 98–107)
CO2 SERPL-SCNC: 27 MMOL/L (ref 20–31)
CREAT SERPL-MCNC: 1 MG/DL (ref 0.7–1.2)
EOSINOPHIL # BLD: 0.03 K/UL (ref 0–0.4)
EOSINOPHILS RELATIVE PERCENT: 1 % (ref 1–4)
ERYTHROCYTE [DISTWIDTH] IN BLOOD BY AUTOMATED COUNT: 14.7 % (ref 12.5–15.4)
GFR, ESTIMATED: 86 ML/MIN/1.73M2
GLUCOSE SERPL-MCNC: 81 MG/DL (ref 70–99)
HCT VFR BLD AUTO: 30.7 % (ref 41–53)
HGB BLD-MCNC: 10.1 G/DL (ref 13.5–17.5)
LYMPHOCYTES NFR BLD: 1.22 K/UL (ref 1–4.8)
LYMPHOCYTES RELATIVE PERCENT: 38 % (ref 24–44)
MCH RBC QN AUTO: 33.2 PG (ref 26–34)
MCHC RBC AUTO-ENTMCNC: 33 G/DL (ref 31–37)
MCV RBC AUTO: 100.5 FL (ref 80–100)
MONOCYTES NFR BLD: 0.29 K/UL (ref 0.1–0.8)
MONOCYTES NFR BLD: 9 % (ref 1–7)
MORPHOLOGY: NORMAL
NEUTROPHILS NFR BLD: 50 % (ref 36–66)
NEUTS SEG NFR BLD: 1.6 K/UL (ref 1.8–7.7)
PLATELET # BLD AUTO: 144 K/UL (ref 140–450)
PMV BLD AUTO: 7.1 FL (ref 6–12)
POTASSIUM SERPL-SCNC: 5 MMOL/L (ref 3.7–5.3)
RBC # BLD AUTO: 3.06 M/UL (ref 4.5–5.9)
SODIUM SERPL-SCNC: 141 MMOL/L (ref 135–144)
WBC OTHER # BLD: 3.2 K/UL (ref 3.5–11)

## 2024-06-27 PROCEDURE — 36415 COLL VENOUS BLD VENIPUNCTURE: CPT

## 2024-06-27 PROCEDURE — 85025 COMPLETE CBC W/AUTO DIFF WBC: CPT

## 2024-06-27 PROCEDURE — 80048 BASIC METABOLIC PNL TOTAL CA: CPT

## 2024-07-03 ENCOUNTER — OFFICE VISIT (OUTPATIENT)
Dept: ONCOLOGY | Age: 59
End: 2024-07-03
Payer: COMMERCIAL

## 2024-07-03 VITALS
DIASTOLIC BLOOD PRESSURE: 78 MMHG | RESPIRATION RATE: 16 BRPM | HEART RATE: 57 BPM | SYSTOLIC BLOOD PRESSURE: 137 MMHG | WEIGHT: 246.2 LBS | TEMPERATURE: 97.2 F | OXYGEN SATURATION: 97 % | BODY MASS INDEX: 31.61 KG/M2

## 2024-07-03 DIAGNOSIS — D53.9 MACROCYTIC ANEMIA: ICD-10-CM

## 2024-07-03 DIAGNOSIS — D70.9 NEUTROPENIA, UNSPECIFIED TYPE (HCC): ICD-10-CM

## 2024-07-03 DIAGNOSIS — D46.9 MDS (MYELODYSPLASTIC SYNDROME) (HCC): Primary | ICD-10-CM

## 2024-07-03 PROCEDURE — 99211 OFF/OP EST MAY X REQ PHY/QHP: CPT | Performed by: INTERNAL MEDICINE

## 2024-07-03 PROCEDURE — 99214 OFFICE O/P EST MOD 30 MIN: CPT | Performed by: INTERNAL MEDICINE

## 2024-07-03 NOTE — PROGRESS NOTES
Fabrice Cruz Sr.                                                                                                                  7/3/2024  MRN:   [id number]  YOB: 1965  PCP:                           Chuy Clemente MD  Referring Physician: No ref. 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  Clinically patient is doing well.  Remains neutropenic but denies recurrent infections.  Continues to be active.  Remains transfusion free.  Patient bone marrow biopsy came back consistent with MDS-EB 1. Denies any spontaneous bruising or excessive bleeding.    During this visit patient's allergy, social, medical, surgical history and medications were reviewed and updated.    Summary of Case/History:  Fabrice Cruz Sr. a 59 y.o.male is a patient with longstanding history of leukopenia presents to the clinic to establish care and for further workup and evaluation.  Patient has longstanding history of [place] which has been worked in the past patient also has been to [facility] however workup did not reveal any particular etiology of the leukopenia.  Patient denies any recurrent infections.  More recently patient also has developed macrocytic anemia patient's hemoglobin was 12.4 with MCV of 93 back in February 2018.  Patient CBC from December 2023 shows hemoglobin of 10.8 with MCV of 109.  Patient denies any heavy alcohol consumption.  Patient has had bone marrow biopsy done in the past we did not reveal any etiology of the leukopenia.  Denies any history of liver disease.

## 2024-08-26 RX ORDER — ROSUVASTATIN CALCIUM 20 MG/1
20 TABLET, COATED ORAL DAILY
Qty: 90 TABLET | Refills: 1 | Status: SHIPPED | OUTPATIENT
Start: 2024-08-26

## 2024-11-03 ENCOUNTER — HOSPITAL ENCOUNTER (OUTPATIENT)
Age: 59
Discharge: HOME | End: 2024-11-03
Payer: COMMERCIAL

## 2024-11-03 DIAGNOSIS — D70.9 NEUTROPENIA, UNSPECIFIED TYPE (HCC): ICD-10-CM

## 2024-11-03 DIAGNOSIS — D53.9 MACROCYTIC ANEMIA: ICD-10-CM

## 2024-11-03 DIAGNOSIS — D46.9 MDS (MYELODYSPLASTIC SYNDROME) (HCC): ICD-10-CM

## 2024-11-03 LAB
ANION GAP SERPL CALCULATED.3IONS-SCNC: 10 MMOL/L (ref 9–16)
BUN SERPL-MCNC: 13 MG/DL (ref 8–23)
CALCIUM SERPL-MCNC: 9.1 MG/DL (ref 8.6–10.4)
CHLORIDE SERPL-SCNC: 104 MMOL/L (ref 98–107)
CO2 SERPL-SCNC: 27 MMOL/L (ref 20–31)
CREAT SERPL-MCNC: 0.9 MG/DL (ref 0.7–1.2)
GFR, ESTIMATED: >90 ML/MIN/1.73M2
GLUCOSE SERPL-MCNC: 151 MG/DL (ref 74–99)
POTASSIUM SERPL-SCNC: 4.2 MMOL/L (ref 3.7–5.3)
SODIUM SERPL-SCNC: 141 MMOL/L (ref 136–145)

## 2024-11-03 PROCEDURE — 80048 BASIC METABOLIC PNL TOTAL CA: CPT

## 2024-11-03 PROCEDURE — 85025 COMPLETE CBC W/AUTO DIFF WBC: CPT

## 2024-11-03 PROCEDURE — 36415 COLL VENOUS BLD VENIPUNCTURE: CPT

## 2024-11-04 LAB
ATYPICAL LYMPHOCYTE ABSOLUTE COUNT: 0.06 K/UL
ATYPICAL LYMPHOCYTES: 3 %
BASOPHILS # BLD: 0.02 K/UL (ref 0–0.2)
BASOPHILS NFR BLD: 1 % (ref 0–2)
EOSINOPHIL # BLD: 0 K/UL (ref 0–0.4)
EOSINOPHILS RELATIVE PERCENT: 0 % (ref 1–4)
ERYTHROCYTE [DISTWIDTH] IN BLOOD BY AUTOMATED COUNT: 14.3 % (ref 11.8–14.4)
HCT VFR BLD AUTO: 36.8 % (ref 40.7–50.3)
HGB BLD-MCNC: 11.3 G/DL (ref 13–17)
IMM GRANULOCYTES # BLD AUTO: 0 K/UL (ref 0–0.3)
IMM GRANULOCYTES NFR BLD: 0 %
LYMPHOCYTES NFR BLD: 1.18 K/UL (ref 1–4.8)
LYMPHOCYTES RELATIVE PERCENT: 59 % (ref 24–44)
MCH RBC QN AUTO: 31.9 PG (ref 25.2–33.5)
MCHC RBC AUTO-ENTMCNC: 30.7 G/DL (ref 28.4–34.8)
MCV RBC AUTO: 104 FL (ref 82.6–102.9)
MONOCYTES NFR BLD: 0.14 K/UL (ref 0.1–0.8)
MONOCYTES NFR BLD: 7 % (ref 1–7)
MORPHOLOGY: ABNORMAL
NEUTROPHILS NFR BLD: 30 % (ref 36–66)
NEUTS SEG NFR BLD: 0.6 K/UL (ref 1.8–7.7)
NRBC BLD-RTO: 0 PER 100 WBC
PLATELET # BLD AUTO: 133 K/UL (ref 138–453)
PMV BLD AUTO: 9.5 FL (ref 8.1–13.5)
RBC # BLD AUTO: 3.54 M/UL (ref 4.21–5.77)
WBC OTHER # BLD: 2 K/UL (ref 3.5–11.3)

## 2024-11-06 ENCOUNTER — OFFICE VISIT (OUTPATIENT)
Dept: ONCOLOGY | Age: 59
End: 2024-11-06
Payer: COMMERCIAL

## 2024-11-06 ENCOUNTER — TELEPHONE (OUTPATIENT)
Dept: ONCOLOGY | Age: 59
End: 2024-11-06

## 2024-11-06 VITALS
HEART RATE: 56 BPM | RESPIRATION RATE: 18 BRPM | TEMPERATURE: 97.2 F | WEIGHT: 244.8 LBS | SYSTOLIC BLOOD PRESSURE: 128 MMHG | OXYGEN SATURATION: 97 % | BODY MASS INDEX: 31.43 KG/M2 | DIASTOLIC BLOOD PRESSURE: 77 MMHG

## 2024-11-06 DIAGNOSIS — D70.9 NEUTROPENIA, UNSPECIFIED TYPE (HCC): ICD-10-CM

## 2024-11-06 DIAGNOSIS — D46.9 MDS (MYELODYSPLASTIC SYNDROME) (HCC): Primary | ICD-10-CM

## 2024-11-06 PROCEDURE — 99211 OFF/OP EST MAY X REQ PHY/QHP: CPT | Performed by: INTERNAL MEDICINE

## 2024-11-06 PROCEDURE — 99214 OFFICE O/P EST MOD 30 MIN: CPT | Performed by: INTERNAL MEDICINE

## 2024-11-06 NOTE — PROGRESS NOTES
of the anterior fat pad to suggest joint effusion or hemarthrosis. Spurring of the olecranon and medial epicondyle.  A couple of tiny well corticated ossific opacities by the lateral epicondyle are likely degenerative or due to remote trauma. Right elbow: Mild degenerative changes without convincing evidence of acute fracture or dislocation.  No elevation of the anterior fat pad to suggest significant joint effusion.  Enthesopathic spurring of the medial epicondyle and olecranon.  Bone mineralization and alignment appear intact.     Bilateral elbows: Degenerative changes with enthesopathic spurring of the olecranon and epicondyles.  Subtle deformity left radial head is likely due to remote trauma and/or degenerative. Bilateral shoulders: Minor degenerative changes.  Radiographic findings suggesting rotator cuff arthropathy. AP pelvis and bilateral hips: Mild degenerative changes of the hips.        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  Discussed results of bone marrow biopsy.  Explained to the patient in layman's terms.  Patient has MDS with excess blast 1.  Discussed natural history of MDS  Patient counseled on neutropenic precaution.  Will hold off on starting patient on any prophylactic antimicrobials.  At this point patient remains transfusion independent, no recurrent infections, platelet count adequate.  Continue surveillance.  Will initiate LAVELL therapy if hemoglobin drops below 10.  Patient counseled on neutropenic precautions  Monitor platelet count  Discussed risk of disease progression  Return to clinic in 4 months  NCCN guidelines were reviewed and discussed with the patient.  The diagnosis and care plan were discussed with the patient in detail. I discussed the natural history of the

## 2024-11-30 ENCOUNTER — HOSPITAL ENCOUNTER (EMERGENCY)
Age: 59
Discharge: HOME | End: 2024-11-30
Attending: STUDENT IN AN ORGANIZED HEALTH CARE EDUCATION/TRAINING PROGRAM
Payer: COMMERCIAL

## 2024-11-30 ENCOUNTER — APPOINTMENT (OUTPATIENT)
Dept: CT IMAGING | Age: 59
End: 2024-11-30
Payer: COMMERCIAL

## 2024-11-30 VITALS
HEIGHT: 76 IN | WEIGHT: 244 LBS | SYSTOLIC BLOOD PRESSURE: 146 MMHG | RESPIRATION RATE: 16 BRPM | HEART RATE: 60 BPM | BODY MASS INDEX: 29.71 KG/M2 | TEMPERATURE: 98.2 F | OXYGEN SATURATION: 96 % | DIASTOLIC BLOOD PRESSURE: 81 MMHG

## 2024-11-30 DIAGNOSIS — R10.32 LEFT GROIN PAIN: Primary | ICD-10-CM

## 2024-11-30 LAB
ALBUMIN SERPL-MCNC: 4.1 G/DL (ref 3.5–5.2)
ALBUMIN/GLOB SERPL: 1.3 {RATIO} (ref 1–2.5)
ALP SERPL-CCNC: 48 U/L (ref 40–129)
ALT SERPL-CCNC: 19 U/L (ref 5–41)
ANION GAP SERPL CALCULATED.3IONS-SCNC: 8 MMOL/L (ref 9–17)
AST SERPL-CCNC: 18 U/L
BASOPHILS # BLD: 0 K/UL (ref 0–0.2)
BASOPHILS NFR BLD: 1 % (ref 0–2)
BILIRUB SERPL-MCNC: 0.2 MG/DL (ref 0.3–1.2)
BILIRUB UR QL STRIP: NEGATIVE
BUN SERPL-MCNC: 19 MG/DL (ref 8–23)
CALCIUM SERPL-MCNC: 9.2 MG/DL (ref 8.6–10.4)
CHLORIDE SERPL-SCNC: 107 MMOL/L (ref 98–107)
CLARITY UR: CLEAR
CO2 SERPL-SCNC: 27 MMOL/L (ref 20–31)
COLOR UR: YELLOW
COMMENT: NORMAL
CREAT SERPL-MCNC: 1 MG/DL (ref 0.7–1.2)
EOSINOPHIL # BLD: 0 K/UL (ref 0–0.4)
EOSINOPHILS RELATIVE PERCENT: 1 % (ref 1–4)
ERYTHROCYTE [DISTWIDTH] IN BLOOD BY AUTOMATED COUNT: 14.8 % (ref 12.5–15.4)
GFR, ESTIMATED: 86 ML/MIN/1.73M2
GLUCOSE SERPL-MCNC: 107 MG/DL (ref 70–99)
GLUCOSE UR STRIP-MCNC: NEGATIVE MG/DL
HCT VFR BLD AUTO: 34.5 % (ref 41–53)
HGB BLD-MCNC: 11.4 G/DL (ref 13.5–17.5)
HGB UR QL STRIP.AUTO: NEGATIVE
KETONES UR STRIP-MCNC: NEGATIVE MG/DL
LACTATE BLDV-SCNC: 0.9 MMOL/L (ref 0.5–2.2)
LEUKOCYTE ESTERASE UR QL STRIP: NEGATIVE
LYMPHOCYTES NFR BLD: 1.1 K/UL (ref 1–4.8)
LYMPHOCYTES RELATIVE PERCENT: 55 % (ref 24–44)
MCH RBC QN AUTO: 32.9 PG (ref 26–34)
MCHC RBC AUTO-ENTMCNC: 32.9 G/DL (ref 31–37)
MCV RBC AUTO: 100.1 FL (ref 80–100)
MONOCYTES NFR BLD: 0.1 K/UL (ref 0.1–1.2)
MONOCYTES NFR BLD: 7 % (ref 2–11)
NEUTROPHILS NFR BLD: 36 % (ref 36–66)
NEUTS SEG NFR BLD: 0.7 K/UL (ref 1.8–7.7)
NITRITE UR QL STRIP: NEGATIVE
PH UR STRIP: 6 [PH] (ref 5–8)
PLATELET # BLD AUTO: 129 K/UL (ref 140–450)
PMV BLD AUTO: 7.5 FL (ref 6–12)
POTASSIUM SERPL-SCNC: 4.7 MMOL/L (ref 3.7–5.3)
PROT SERPL-MCNC: 7.3 G/DL (ref 6.4–8.3)
PROT UR STRIP-MCNC: NEGATIVE MG/DL
RBC # BLD AUTO: 3.45 M/UL (ref 4.5–5.9)
SODIUM SERPL-SCNC: 142 MMOL/L (ref 135–144)
SP GR UR STRIP: 1.02 (ref 1–1.03)
UROBILINOGEN UR STRIP-ACNC: NORMAL EU/DL (ref 0–1)
WBC OTHER # BLD: 2 K/UL (ref 3.5–11)

## 2024-11-30 PROCEDURE — 6360000002 HC RX W HCPCS: Performed by: STUDENT IN AN ORGANIZED HEALTH CARE EDUCATION/TRAINING PROGRAM

## 2024-11-30 PROCEDURE — 74177 CT ABD & PELVIS W/CONTRAST: CPT

## 2024-11-30 PROCEDURE — 99285 EMERGENCY DEPT VISIT HI MDM: CPT | Performed by: STUDENT IN AN ORGANIZED HEALTH CARE EDUCATION/TRAINING PROGRAM

## 2024-11-30 PROCEDURE — 2580000003 HC RX 258: Performed by: STUDENT IN AN ORGANIZED HEALTH CARE EDUCATION/TRAINING PROGRAM

## 2024-11-30 PROCEDURE — 96374 THER/PROPH/DIAG INJ IV PUSH: CPT | Performed by: STUDENT IN AN ORGANIZED HEALTH CARE EDUCATION/TRAINING PROGRAM

## 2024-11-30 PROCEDURE — 36415 COLL VENOUS BLD VENIPUNCTURE: CPT

## 2024-11-30 PROCEDURE — 6360000004 HC RX CONTRAST MEDICATION: Performed by: STUDENT IN AN ORGANIZED HEALTH CARE EDUCATION/TRAINING PROGRAM

## 2024-11-30 PROCEDURE — 81003 URINALYSIS AUTO W/O SCOPE: CPT

## 2024-11-30 PROCEDURE — 85025 COMPLETE CBC W/AUTO DIFF WBC: CPT

## 2024-11-30 PROCEDURE — 83605 ASSAY OF LACTIC ACID: CPT

## 2024-11-30 PROCEDURE — 80053 COMPREHEN METABOLIC PANEL: CPT

## 2024-11-30 RX ORDER — HYDROCODONE BITARTRATE AND ACETAMINOPHEN 5; 325 MG/1; MG/1
1 TABLET ORAL EVERY 4 HOURS PRN
Qty: 18 TABLET | Refills: 0 | Status: SHIPPED | OUTPATIENT
Start: 2024-11-30 | End: 2024-12-03

## 2024-11-30 RX ORDER — SODIUM CHLORIDE 0.9 % (FLUSH) 0.9 %
10 SYRINGE (ML) INJECTION PRN
Status: DISCONTINUED | OUTPATIENT
Start: 2024-11-30 | End: 2024-11-30 | Stop reason: HOSPADM

## 2024-11-30 RX ORDER — IOPAMIDOL 755 MG/ML
75 INJECTION, SOLUTION INTRAVASCULAR
Status: COMPLETED | OUTPATIENT
Start: 2024-11-30 | End: 2024-11-30

## 2024-11-30 RX ORDER — KETOROLAC TROMETHAMINE 30 MG/ML
30 INJECTION, SOLUTION INTRAMUSCULAR; INTRAVENOUS ONCE
Status: COMPLETED | OUTPATIENT
Start: 2024-11-30 | End: 2024-11-30

## 2024-11-30 RX ORDER — 0.9 % SODIUM CHLORIDE 0.9 %
80 INTRAVENOUS SOLUTION INTRAVENOUS ONCE
Status: DISCONTINUED | OUTPATIENT
Start: 2024-11-30 | End: 2024-11-30 | Stop reason: HOSPADM

## 2024-11-30 RX ADMIN — IOPAMIDOL 75 ML: 755 INJECTION, SOLUTION INTRAVENOUS at 07:59

## 2024-11-30 RX ADMIN — SODIUM CHLORIDE, PRESERVATIVE FREE 10 ML: 5 INJECTION INTRAVENOUS at 07:59

## 2024-11-30 RX ADMIN — Medication 80 ML: at 08:00

## 2024-11-30 RX ADMIN — KETOROLAC TROMETHAMINE 30 MG: 30 INJECTION, SOLUTION INTRAMUSCULAR at 06:56

## 2024-11-30 ASSESSMENT — ENCOUNTER SYMPTOMS
BACK PAIN: 0
VOMITING: 0
SHORTNESS OF BREATH: 0
ABDOMINAL PAIN: 0

## 2024-11-30 ASSESSMENT — PAIN SCALES - GENERAL: PAINLEVEL_OUTOF10: 10

## 2024-11-30 ASSESSMENT — PAIN - FUNCTIONAL ASSESSMENT: PAIN_FUNCTIONAL_ASSESSMENT: 0-10

## 2024-11-30 ASSESSMENT — PAIN DESCRIPTION - ORIENTATION: ORIENTATION: LEFT;LOWER

## 2024-11-30 NOTE — ED PROVIDER NOTES
[facility] Emergency Department  [address]  [place] [address]  Phone: [phone]  Fax: [phone]        [facility] EMERGENCY DEPARTMENT  EMERGENCY DEPARTMENT ENCOUNTER      Pt Name: Fabrice Cruz Sr.  MRN: [id number]  Birthdate 1965  Date of evaluation: 11/30/2024  Provider: Sakshi Pathak DO    CHIEF COMPLAINT       Chief Complaint   Patient presents with    Groin Pain     Left groin pain. Patient states he was lifting things 2 days ago. Has a history of hernias       HISTORY OF PRESENT ILLNESS   (Location/Symptom, Timing/Onset,Context/Setting, Quality, Duration, Modifying Factors, Severity)  Note limiting factors.     Fabrice Cruz Sr. is a 59 y.o. male who presents to the emergency department with left groin pain.  Ongoing for the past few days.  Patient states he was lifting something and felt like something popped in his left groin.  Has been having pain here since.  History of previous hernia repairs in both inguinal regions, believes he has a mesh in place.  No issues having bowel movements.  No pain in his testicles.  No urinary symptoms.  No fevers or chills.    Nursing Notes were reviewed.    REVIEW OF SYSTEMS       Review of Systems   Constitutional:  Negative for chills and fever.   Respiratory:  Negative for shortness of breath.    Cardiovascular:  Negative for chest pain.   Gastrointestinal:  Negative for abdominal pain and vomiting.   Genitourinary:  Negative for testicular pain.        Positive for groin pain   Musculoskeletal:  Negative for back pain and neck pain.   Skin:  Negative for rash.       PAST MEDICAL HISTORY     Past Medical History:   Diagnosis Date    Arthritis     Heartburn     Hyperlipidemia     Leukemia (HCC)     Plantar fasciitis, bilateral        SURGICAL HISTORY       Past Surgical History:   Procedure Laterality Date    COLONOSCOPY      COLONOSCOPY N/A 2/19/2018    COLONOSCOPY WITH BIOPSY performed by Aroldo MACARIO 
negative urinalysis.     Lactic Acid   Result Value Ref Range    Lactic Acid 0.9 0.5 - 2.2 mmol/L       RADIOLOGY:  CT ABDOMEN PELVIS W IV CONTRAST Additional Contrast? None   Preliminary Result   1. No acute intra-abdominal or pelvic process.   2. Colonic diverticulosis without diverticulitis.   3. Small fat containing umbilical hernia without strangulation.   4. Severe degenerative and degenerative disc changes at L5-S1 with neural   foraminal narrowing bilaterally which can lead to positional nerve root   irritation.   5. Bilateral surgical clips at the top of each inguinal canal without   complication suggesting prior hernia surgery. Please correlate with history.               ED Course     The patient was given the following medications:  Orders Placed This Encounter   Medications    ketorolac (TORADOL) injection 30 mg    sodium chloride 0.9 % bolus 80 mL    iopamidol (ISOVUE-370) 76 % injection 75 mL    sodium chloride flush 0.9 % injection 10 mL    HYDROcodone-acetaminophen (NORCO) 5-325 MG per tablet     Sig: Take 1 tablet by mouth every 4 hours as needed for Pain for up to 3 days. Intended supply: 3 days. Take lowest dose possible to manage pain Max Daily Amount: 6 tablets     Dispense:  18 tablet     Refill:  0         RECENT VITALS:  BP (!) 146/81   Pulse 60   Temp 98.2 °F (36.8 °C) (Oral)   Resp 16   Ht 1.92 m (6' 3.59\")   Wt 110.7 kg (244 lb)   SpO2 96%   BMI 30.02 kg/m²       7 AM care taken over from Dr. Pathak awaiting CAT scan results    9:10 AM CAT scan unremarkable for acute intra-abdominal pathology.  Low white blood cell count and mild anemia hemoglobin of 11.4.  Normal electrolytes and kidney function.  Normal liver enzymes.  Urinalysis no infection.  Still having discomfort we will give her prescription for Norco.  Was given Toradol here.  Unclear etiology for pain.  Inguinal pain denies testicular pain no hematuria or dysuria.  No constipation or diarrhea.  Will discharge to follow-up

## 2024-11-30 NOTE — DISCHARGE INSTRUCTIONS
Take medications as prescribed  Follow-up with family physician for reevaluation  No driving if taking pain  Return immediately if any worsening symptoms or any other concerns    Please understand that early in the process of an illness or injury, an emergency department workup can be falsely reassuring.      Tell us how we did visit: [email or web address]   and let us know about your experience

## 2024-12-25 NOTE — PROGRESS NOTES
[facility]  [address] [place] [address]  Phone: [phone]  Fax: [phone]    OFFICE VISIT       Date of Visit:  2024  Patient Name: Fabrice Cruz Sr.   Patient :  1965     ASSESSMENT/PLAN     1. Lumbar radiculopathy  -     [facility]  2. Right sided sciatica  -     gabapentin (NEURONTIN) 300 MG capsule; Take 1 capsule by mouth nightly for 180 days. Intended supply: 90 days, Disp-90 capsule, R-1Normal  -     [facility]  3. Strain of gluteus medius of right lower extremity, initial encounter  4. Former smoker       Lumbar radiculopathy  Right-sided sciatica  Strain of gluteus medius of right lower extremity  Patient was referred to Dr. Tse from ER, patient will make an appointment  Ordered PT for lumbar radiculopathy and right sided sciatica  Ordered gabapentin 300 mg once nightly  Ordered Flexeril 10 mg twice daily as needed for muscle spasms  Recommended patient stop taking steroids as he found no benefit within the first 24 hours      Resident Attestation:  Personally seen and discussed with preceptor Dr. Mendoza at the time of the encounter.  Salah Awadalla MD  Family Medicine, PGY3  [address] [facility] [address] [phone]       Questions/concerns answered. Patient verbalized and expressed understanding. Medications, laboratory testing, imaging, consultation, and follow up as documented in this encounter. I personally reviewed the patient's past medical history, current medications, allergies, surgical history, family history and social history. Updates were made as necessary. Please be aware portions of this note were completed using voice recognition software and unforeseen errors may have occurred    HPI:     Fabrice Cruz Sr. is a 59 y.o. male who presents today to discuss pain in his right hip and buttocks.    Patient has past medical

## 2024-12-26 ENCOUNTER — HOSPITAL ENCOUNTER (EMERGENCY)
Age: 59
Discharge: HOME | End: 2024-12-26
Attending: EMERGENCY MEDICINE
Payer: COMMERCIAL

## 2024-12-26 VITALS
SYSTOLIC BLOOD PRESSURE: 156 MMHG | RESPIRATION RATE: 18 BRPM | OXYGEN SATURATION: 95 % | HEART RATE: 62 BPM | WEIGHT: 245 LBS | BODY MASS INDEX: 30.46 KG/M2 | DIASTOLIC BLOOD PRESSURE: 97 MMHG | HEIGHT: 75 IN | TEMPERATURE: 97.9 F

## 2024-12-26 DIAGNOSIS — M54.17 LUMBOSACRAL RADICULOPATHY: Primary | ICD-10-CM

## 2024-12-26 PROCEDURE — 6370000000 HC RX 637 (ALT 250 FOR IP): Performed by: NURSE PRACTITIONER

## 2024-12-26 PROCEDURE — 6360000002 HC RX W HCPCS: Performed by: NURSE PRACTITIONER

## 2024-12-26 PROCEDURE — 99284 EMERGENCY DEPT VISIT MOD MDM: CPT

## 2024-12-26 PROCEDURE — 96372 THER/PROPH/DIAG INJ SC/IM: CPT

## 2024-12-26 RX ORDER — OXYCODONE AND ACETAMINOPHEN 5; 325 MG/1; MG/1
1 TABLET ORAL ONCE
Status: COMPLETED | OUTPATIENT
Start: 2024-12-26 | End: 2024-12-26

## 2024-12-26 RX ORDER — DEXAMETHASONE SODIUM PHOSPHATE 10 MG/ML
10 INJECTION, SOLUTION INTRAMUSCULAR; INTRAVENOUS ONCE
Status: COMPLETED | OUTPATIENT
Start: 2024-12-26 | End: 2024-12-26

## 2024-12-26 RX ORDER — PREDNISONE 20 MG/1
60 TABLET ORAL DAILY
Qty: 15 TABLET | Refills: 0 | Status: SHIPPED | OUTPATIENT
Start: 2024-12-26 | End: 2024-12-31

## 2024-12-26 RX ORDER — OXYCODONE AND ACETAMINOPHEN 5; 325 MG/1; MG/1
1 TABLET ORAL EVERY 6 HOURS PRN
Qty: 12 TABLET | Refills: 0 | Status: SHIPPED | OUTPATIENT
Start: 2024-12-26 | End: 2024-12-29

## 2024-12-26 RX ADMIN — OXYCODONE HYDROCHLORIDE AND ACETAMINOPHEN 1 TABLET: 5; 325 TABLET ORAL at 11:39

## 2024-12-26 RX ADMIN — DEXAMETHASONE SODIUM PHOSPHATE 10 MG: 10 INJECTION INTRAMUSCULAR; INTRAVENOUS at 11:39

## 2024-12-26 ASSESSMENT — PAIN DESCRIPTION - ORIENTATION: ORIENTATION: RIGHT

## 2024-12-26 ASSESSMENT — PAIN SCALES - GENERAL
PAINLEVEL_OUTOF10: 10
PAINLEVEL_OUTOF10: 10

## 2024-12-26 ASSESSMENT — PAIN - FUNCTIONAL ASSESSMENT: PAIN_FUNCTIONAL_ASSESSMENT: 0-10

## 2024-12-26 ASSESSMENT — PAIN DESCRIPTION - LOCATION: LOCATION: HIP

## 2024-12-26 NOTE — ED PROVIDER NOTES
[facility] EMERGENCY DEPARTMENT  EMERGENCY DEPARTMENT ENCOUNTER      Pt Name: Fabrice Cruz Sr.  MRN: [id number]  Birthdate 1965  Date of evaluation: 12/26/2024  Provider: SHABANA Worrell CNP  11:36 AM    CHIEF COMPLAINT       Chief Complaint   Patient presents with    Hip Pain     Patient c/o right hip pain that started a couple of weeks ago. Patient denies any injury to the hip. Patient states he was here last week for something similar but states he can hardly walk now.          HISTORY OF PRESENT ILLNESS    Fabrice Cruz Sr. is a 59 y.o. male who presents to the emergency department for evaluation of right hip pain.  He describes this is pain that is mostly in his piriformis and buttocks area it is a deep ache worse with sitting standing walking.  He states walking is nearly impossible.  Pain radiates down the lateral aspect of the right leg and into the right lower leg and right foot.  He does have periods of tingling but no numbness and no loss of strength.  He was sent home last week with [place] for a different complaint and that has not helped the pain that he is having today.  No injury.  No back pain.  He is a  by [employer]    Nursing Notes were reviewed.    REVIEW OF SYSTEMS       Review of Systems   All other systems reviewed and are negative.      Except as noted above the remainder of the review of systems was reviewed and negative.       PAST MEDICAL HISTORY     Past Medical History:   Diagnosis Date    Arthritis     Heartburn     Hyperlipidemia     Leukemia (HCC)     Plantar fasciitis, bilateral          SURGICAL HISTORY       Past Surgical History:   Procedure Laterality Date    COLONOSCOPY      COLONOSCOPY N/A 2/19/2018    COLONOSCOPY WITH BIOPSY performed by Aroldo Cabrera MD at [facility] OR    CT BIOPSY BONE MARROW  2/13/2024    CT BONE MARROW BIOPSY 2/13/2024 [facility] CT SCAN    ELBOW SURGERY Right     HERNIA REPAIR Bilateral 2006    AK COLON CA SCRN NOT HI RSK  etiology. The patient is neurologically intact and appears stable for discharge. No skin lesions were seen. The pulses are 2/4 bilaterally. The motor is 5/5 bilaterally. The sensation is intact.  Given the extremely low risk of these diagnoses further testing and evaluation for these possibilities does not appear to be indicated at this time. The patient was advised to return to the Emergency Department for worsening pain, numbness, weakness, difficulty with urination or defecation, difficulty with ambulation, fever, abdominal pain, coolness or color change to the extremity.      The patient understands that at this time there is no evidence for a more malignant underlying process, but the patient also understands that early in the process of an illness or injury, an emergency department workup can be falsely reassuring.  Routine discharge counseling was given, and the patient understands that worsening, changing or persistent symptoms should prompt an immediate call or follow up with their primary physician or return to the emergency department. The importance of appropriate follow up was also discussed.  I have reviewed the disposition diagnosis with the patient and or their family/guardian.  I have answered their questions and given discharge instructions.  They voiced understanding of these instructions and did not have any further questions or complaints.          REASSESSMENT          CRITICAL CARE TIME       CONSULTS:  None    PROCEDURES:  Unless otherwise noted below, none     Procedures        FINAL IMPRESSION      1. Lumbosacral radiculopathy          DISPOSITION/PLAN   DISPOSITION Decision To Discharge 12/26/2024 11:36:52 AM   DISPOSITION CONDITION Stable           PATIENT REFERRED TO:  Rickie Tse DO  [address] [address]  [phone]    On 12/30/2024  Call tomorrow for an appointment on Monday morning    [facility] Emergency Department  [address]

## 2024-12-26 NOTE — ED PROVIDER NOTES
[facility] Emergency Department      Pt Name: Fabrice Cruz Sr.  MRN: [id number]  Birthdate 1965  Date of evaluation: 12/26/2024    EMERGENCY DEPARTMENT ENCOUNTER           I reviewed the mid level provider's note and agree with the documented findings and we have discussed the plan of care. I have reviewed the emergency nurses triage note. I agree with the chief complaint, past medical history, past surgical history, allergies, medications, social and family history as documented unless otherwise noted below.       Yesica Hanley, DO  12/26/24 1214

## 2024-12-26 NOTE — DISCHARGE INSTRUCTIONS
Home Percocet as needed for severe pain.  Steroids as directed to help with inflammation.  Follow-up with your primary care doctor tomorrow as planned.  Please discuss the concern for L5-S1 radiculopathy and nerve root impingement as noted on CT scan when you are here in the emergency department earlier.  You will need an MRI.  I have discussed the case with Dr. Mosqueda and Dr. Tse.  Either one of them can see you in the office.  Please call today, you may be able to get an appointment tomorrow otherwise you for sure can have an appointment on Monday with Dr. Tse.  Return immediately for any numbness tingling weakness, abdominal pain, bowel or bladder dysfunction, fevers or any other concerns.

## 2024-12-27 ENCOUNTER — OFFICE VISIT (OUTPATIENT)
Age: 59
End: 2024-12-27
Payer: COMMERCIAL

## 2024-12-27 VITALS
HEIGHT: 75 IN | SYSTOLIC BLOOD PRESSURE: 124 MMHG | TEMPERATURE: 98 F | HEART RATE: 68 BPM | RESPIRATION RATE: 16 BRPM | BODY MASS INDEX: 30.61 KG/M2 | DIASTOLIC BLOOD PRESSURE: 62 MMHG | WEIGHT: 246.2 LBS

## 2024-12-27 DIAGNOSIS — M54.31 RIGHT SIDED SCIATICA: ICD-10-CM

## 2024-12-27 DIAGNOSIS — Z87.891 FORMER SMOKER: ICD-10-CM

## 2024-12-27 DIAGNOSIS — M54.16 LUMBAR RADICULOPATHY: Primary | ICD-10-CM

## 2024-12-27 DIAGNOSIS — S76.011A STRAIN OF GLUTEUS MEDIUS OF RIGHT LOWER EXTREMITY, INITIAL ENCOUNTER: ICD-10-CM

## 2024-12-27 PROCEDURE — 99212 OFFICE O/P EST SF 10 MIN: CPT

## 2024-12-27 PROCEDURE — 99213 OFFICE O/P EST LOW 20 MIN: CPT

## 2024-12-27 RX ORDER — CYCLOBENZAPRINE HCL 10 MG
10 TABLET ORAL 2 TIMES DAILY PRN
Qty: 30 TABLET | Refills: 0 | Status: SHIPPED | OUTPATIENT
Start: 2024-12-27 | End: 2025-01-11

## 2024-12-27 RX ORDER — IBUPROFEN 800 MG/1
1 TABLET, FILM COATED ORAL 3 TIMES DAILY PRN
COMMUNITY

## 2024-12-27 RX ORDER — GABAPENTIN 300 MG/1
300 CAPSULE ORAL NIGHTLY
Qty: 90 CAPSULE | Refills: 1 | Status: SHIPPED | OUTPATIENT
Start: 2024-12-27 | End: 2025-01-17 | Stop reason: SDUPTHER

## 2024-12-27 SDOH — ECONOMIC STABILITY: FOOD INSECURITY: WITHIN THE PAST 12 MONTHS, YOU WORRIED THAT YOUR FOOD WOULD RUN OUT BEFORE YOU GOT MONEY TO BUY MORE.: NEVER TRUE

## 2024-12-27 SDOH — ECONOMIC STABILITY: INCOME INSECURITY: HOW HARD IS IT FOR YOU TO PAY FOR THE VERY BASICS LIKE FOOD, HOUSING, MEDICAL CARE, AND HEATING?: NOT HARD AT ALL

## 2024-12-27 SDOH — ECONOMIC STABILITY: FOOD INSECURITY: WITHIN THE PAST 12 MONTHS, THE FOOD YOU BOUGHT JUST DIDN'T LAST AND YOU DIDN'T HAVE MONEY TO GET MORE.: NEVER TRUE

## 2024-12-27 NOTE — PATIENT INSTRUCTIONS
Thank you for coming into the clinic today  -As discussed we will send you to physical therapy at [facility]  -I would like you to start gabapentin once nightly and see if this helps with your nerve pains  -I will also send medication known as Flexeril, this is a muscle relaxer I would like you to take it at night  -I would like you to call Dr. Tse and get appointment set up so that he can evaluate you and order an MRI if needed  -You can stop the steroids as you did not put see any benefit within 24 hours, you are unlikely to see any benefit on the steroids  -I would recommend continuing with the lidocaine patch, heat therapy, you could even use Biofreeze  -I would like to see you back in 1 month to see if your pain is improving unless you are able to get in and see Dr. Tse

## 2025-01-02 ENCOUNTER — HOSPITAL ENCOUNTER (OUTPATIENT)
Age: 60
Setting detail: THERAPIES SERIES
Discharge: HOME | End: 2025-01-02
Payer: COMMERCIAL

## 2025-01-02 PROCEDURE — 97161 PT EVAL LOW COMPLEX 20 MIN: CPT

## 2025-01-02 PROCEDURE — 97110 THERAPEUTIC EXERCISES: CPT

## 2025-01-02 NOTE — CONSULTS
[x] [facility]  Outpatient Physical Therapy  [address]  Phone: [phone]  Fax: [phone]       Physical Therapy Evaluation    Date:  2025  Patient: Fabrice Cruz .  : 1965  MRN: [id number]  Physician: Alfie Mendoza MD   Insurance: [facility] (BMN, no co-pay, $500 deductible)  Medical Diagnosis: Right sided sciatica [M54.31], Lumbar radiculopathy [M54.16]     Rehab Codes: M54.50, M54.51, M54.41  Onset Date: 2024                                 Next 's appt: 2025 at [facility] Physician [facility].    Subjective: This pt reports the onset of R low back pain in early 2024. His sx were originally in the R buttock, but have moved laterally and inferiorly into the lat R hip and R calf. He reports his R toes, 1 through 5 are numb at times. He has a difficult time finding a position of comfort and tries to sleep on his R side as it is the most comfortable position he can find. Standing and are tolerable, but do increase R calf pain. Lying supine on the floor with hips/knees at 90 degrees and lower legs on couch is comfortable. He is using only ibuprofen for pain control.  Past Medical History:   Diagnosis Date    Arthritis     Heartburn     Hyperlipidemia     Leukemia (HCC)     Plantar fasciitis, bilateral       Medications: [x] Refer to full medical record [] None [] Other:  Allergies:      [x] Refer to full medical record  [] None [] Other:    Tests / Imaging: Pt underwent abdominal CT scan 2024 which indicates severe degeneration of L5-S1 disc with significant loss of disc space and  nerve root irritation.    Function:    Patient lives with:  wife   In what type of home []  One story   [x] Two story   [] Multi-level   Number of stairs to enter  2   With handrail on the []  Right to enter   [] Left to enter   Primary Bed/Bath  2   Bathroom has a []  Tub only  [] Tub/shower combo   [x] Walk in shower    []  Grab bars   Washing machine is on [x]  Main

## 2025-01-06 ENCOUNTER — HOSPITAL ENCOUNTER (OUTPATIENT)
Age: 60
Setting detail: THERAPIES SERIES
Discharge: HOME | End: 2025-01-06
Payer: COMMERCIAL

## 2025-01-06 NOTE — FLOWSHEET NOTE
[x] [facility]  Outpatient Rehabilitation &  Therapy  [address]  P: [phone]  F: [phone]              Therapy Cancel/No Show note    Date: 2025  Patient: Fabrice HUNT Nancy Pace.  : 1965  MRN: [id number]    Cancels/No Shows to date:     For today's appointment patient:    [x]  Cancelled    [] Rescheduled appointment    [] No-show     Reason given by patient:    [x]  Patient ill    []  Conflicting appointment    [] No transportation      [] Conflict with work    [] No reason given    [] Weather related    [] COVID-19    [] Other:      Comments:        [x] Next appointment was confirmed    Electronically signed by: Colton Monique PT

## 2025-01-09 ENCOUNTER — HOSPITAL ENCOUNTER (OUTPATIENT)
Age: 60
Setting detail: THERAPIES SERIES
Discharge: HOME | End: 2025-01-09
Payer: COMMERCIAL

## 2025-01-09 PROCEDURE — 97110 THERAPEUTIC EXERCISES: CPT

## 2025-01-09 PROCEDURE — 97012 MECHANICAL TRACTION THERAPY: CPT

## 2025-01-09 NOTE — FLOWSHEET NOTE
[x] [facility]  Outpatient Rehabilitation &  Therapy  [address]  P: [phone]  F: [phone]              Physical Therapy Daily Treatment Note    Date:  2025  Patient Name:  Fabrice Cruz Sr.    :  1965  MRN: [id number]  Physician:  Alfie Mendoza MD     Insurance: [facility] (BMN, no co-pay, $500 deductible)   Medical Diagnosis: Right sided sciatica [M54.31], Lumbar radiculopathy [M54.16]   Rehab Codes: M54.50, M54.51, M54.41    Onset Date: 2024     Next  Appt: 2025 at [facility] Physician [facility].   Visit# / total visits: ; Progress note for  patient due at visit 10     Cancels/No Shows: 1/0    Subjective:   Pt reports a mild decrease of sx in the lateral R hip and posterolateral R calf.  Pain:  [x] Yes  [] No Location: lateral R hip and posterolateral R calf Pain Rating: (0-10 scale) 6/10  Pain altered Tx:  [] No  [x] Yes  Action: Avoided ex that increased LE sx  Comments: Gait is with minimal LLE antalgia    Objective:  Modalities: Supine intermittent pelvic traction with flexion stool x 15 mins at 60#/35# on a 60\"/20\"  Precautions:  Exercises:  Exercise Reps/ Time Weight/ Level Comments   LTR 10 reps  11 o'clock to 2 o'clock         Abdominal isometrics  10 x 5 secs each           Reverse bridge 10 reps           Hook lying marching 12 reps           R single leg to chest 10 x 5 secs each           Dbl knee to chest 5 x 10 secs each                                                           Other: review of body mechanics and home ex rx  Exercises: for home  Access Code: [email or web address]  URL: [email or web address]  Date: 2025  Prepared by: Colton Monique     Program Notes  - Do not hold your breath during the exercises- Sit with low back support- Try ice to the low back for 10-15 mins 1-2 x per day     Exercises  - Supine Transversus Abdominis Bracing - Hands on Stomach  - 2 x daily - 7 x weekly - 1 sets - 10 reps - 5 seconds hold  -

## 2025-01-13 ENCOUNTER — HOSPITAL ENCOUNTER (OUTPATIENT)
Age: 60
Setting detail: THERAPIES SERIES
Discharge: HOME | End: 2025-01-13
Payer: COMMERCIAL

## 2025-01-13 NOTE — FLOWSHEET NOTE
[x] [facility]  Outpatient Rehabilitation &  Therapy  [address]  P: [phone]  F: [phone]              Therapy Cancel/No Show note    Date: 2025  Patient: Fabrice HUNT Nancy Pace.  : 1965  MRN: [id number]    Cancels/No Shows to date:     For today's appointment patient:    [x]  Cancelled    [] Rescheduled appointment    [] No-show     Reason given by patient:    []  Patient ill    []  Conflicting appointment    [] No transportation      [] Conflict with work    [] No reason given    [] Weather related    [] COVID-19    [x] Other:      Comments: Family member is ill.      [x] Next appointment was confirmed    Electronically signed by: Colton Monique PT

## 2025-01-16 ENCOUNTER — TELEPHONE (OUTPATIENT)
Age: 60
End: 2025-01-16

## 2025-01-16 ENCOUNTER — HOSPITAL ENCOUNTER (OUTPATIENT)
Age: 60
Setting detail: THERAPIES SERIES
Discharge: HOME | End: 2025-01-16
Payer: COMMERCIAL

## 2025-01-16 DIAGNOSIS — M54.31 RIGHT SIDED SCIATICA: ICD-10-CM

## 2025-01-16 PROCEDURE — 97110 THERAPEUTIC EXERCISES: CPT

## 2025-01-16 PROCEDURE — 97012 MECHANICAL TRACTION THERAPY: CPT

## 2025-01-16 NOTE — FLOWSHEET NOTE
[x] [facility]  Outpatient Rehabilitation &  Therapy  [address]  P: [phone]  F: [phone]              Physical Therapy Daily Treatment Note    Date:  2025  Patient Name:  Fabrice Cruz Sr.    :  1965  MRN: [id number]  Physician:  Alfie Mendoza MD     Insurance: [facility] (BMN, no co-pay, $500 deductible)   Medical Diagnosis: Right sided sciatica [M54.31], Lumbar radiculopathy [M54.16]   Rehab Codes: M54.50, M54.51, M54.41    Onset Date: 2024     Next  Appt: 2025 at [facility] Physician [facility].   Visit# / total visits: ; Progress note for  patient due at visit 10     Cancels/No Shows: 1    Subjective:   Pt saw Dr. Tse for a surgical consultation and he is awaiting approval for an MRI.Pt reports he was feeling fairly well with minimal lat R hip and posterolateral R calf sx until yesterday morning when he lifted some boxes at home. He then experienced a major return of sx in the R hip / lower leg which continues today. He states he slept sitting in a cafe height chair and table, leaning forward on the table. He could not get comfortable in his recliner or bed. Today he awoke at 3:30 am and found that walking did minimally decrease his sx to a point, but sitting to drive to PT increased his sx.   Pain:  [x] Yes  [] No Location: lateral R hip and posterolateral R calf Pain Rating: (0-10 scale) 8/10  Pain altered Tx:  [] No  [x] Yes  Action: Avoided ex that increased LE sx   Comments: Gait is with minimal LLE antalgia    Objective: Pt maintains a lumbar flexed posture. He is using his walker for amb assist due to perceived LLE weakness.  Modalities: Supine intermittent pelvic traction with flexion stool x 20 mins at 65#/45# on a 60\"/10\"  Precautions:  Exercises:  Exercise Reps/ Time Weight/ Level Comments NP=Not  Performed   LTR 10 reps  11 o'clock to 2 o'clock           Abdominal isometrics  10 x 5 secs each             Reverse bridge

## 2025-01-16 NOTE — TELEPHONE ENCOUNTER
Patient called, would like to know if he could be prescribed something different for pain, he states what was prescribed for his pain is not helping. What was prescribed was Tizanidine. He would like a call back to see if this could be done.

## 2025-01-17 ENCOUNTER — HOSPITAL ENCOUNTER (OUTPATIENT)
Age: 60
Setting detail: THERAPIES SERIES
Discharge: HOME | End: 2025-01-17
Payer: COMMERCIAL

## 2025-01-17 RX ORDER — GABAPENTIN 300 MG/1
300 CAPSULE ORAL 3 TIMES DAILY
Qty: 270 CAPSULE | Refills: 1
Start: 2025-01-17 | End: 2025-07-16

## 2025-01-17 NOTE — FLOWSHEET NOTE
[x] [facility]  Outpatient Rehabilitation &  Therapy  [address]  P: [phone]  F: [phone]              Therapy Cancel/No Show note    Date: 2025  Patient: Fabrice HUNT Nancy Pace.  : 1965  MRN: [id number]    Cancels/No Shows to date: 3/0    For today's appointment patient:    [x]  Cancelled    [] Rescheduled appointment    [] No-show     Reason given by patient:    []  Patient ill    []  Conflicting appointment    [] No transportation      [] Conflict with work    [] No reason given    [] Weather related    [] COVID-19    [x] Other:      Comments: Pt's wife hospitalized      [x] Next appointment was confirmed    Electronically signed by: Colton Monique PT

## 2025-01-18 NOTE — TELEPHONE ENCOUNTER
When it comes to lumbar radiculopathy there are really many medications that can be given for pain as this is a nerve impingement.  Patient attempted steroids with no relief and is already on daily ibuprofen without symptom resolution.  As patient is currently in physical therapy and is currently not feeling pain relief I will put in a referral for pain management.    Ideally I would like to hold off on this referral and have him see Dr. Tse the orthopedic spine surgeon for further evaluation.    We can increase the gabapentin to 300 mg twice daily once in the morning and once at night. Then in another week if patient is tolerating medication well we can increase to 300 mg 3 times daily.

## 2025-01-23 ENCOUNTER — HOSPITAL ENCOUNTER (OUTPATIENT)
Age: 60
Setting detail: THERAPIES SERIES
Discharge: HOME | End: 2025-01-23
Payer: COMMERCIAL

## 2025-01-23 NOTE — FLOWSHEET NOTE
[x] [facility]  Outpatient Rehabilitation &  Therapy  [address]  P: [phone]  F: [phone]              Therapy Cancel/No Show note    Date: 2025  Patient: Fabrice Cruz .  : 1965  MRN: [id number]    Cancels/No Shows to date: 3/0    For today's appointment patient:    [x]  Cancelled    [] Rescheduled appointment    [] No-show     Reason given by patient:    []  Patient ill    []  Conflicting appointment    [] No transportation      [] Conflict with work    [] No reason given    [] Weather related    [] COVID-19    [x] Other:      Comments: Pt is awaiting an MRI of lumbar spine. He will contact this office after results are known.      [] Next appointment was confirmed    Electronically signed by: Colton Monique PT

## 2025-01-24 ENCOUNTER — APPOINTMENT (OUTPATIENT)
Age: 60
End: 2025-01-24
Payer: COMMERCIAL

## 2025-01-28 ENCOUNTER — HOSPITAL ENCOUNTER (OUTPATIENT)
Dept: MRI IMAGING | Age: 60
Discharge: HOME | End: 2025-01-30
Attending: STUDENT IN AN ORGANIZED HEALTH CARE EDUCATION/TRAINING PROGRAM
Payer: COMMERCIAL

## 2025-01-28 DIAGNOSIS — M47.26 OTHER SPONDYLOSIS WITH RADICULOPATHY, LUMBAR REGION: ICD-10-CM

## 2025-01-28 PROCEDURE — 72148 MRI LUMBAR SPINE W/O DYE: CPT

## 2025-02-03 ENCOUNTER — INITIAL CONSULT (OUTPATIENT)
Dept: PAIN MANAGEMENT | Age: 60
End: 2025-02-03
Payer: COMMERCIAL

## 2025-02-03 VITALS — BODY MASS INDEX: 30.59 KG/M2 | HEIGHT: 75 IN | WEIGHT: 246 LBS

## 2025-02-03 DIAGNOSIS — M54.17 LUMBOSACRAL NEURITIS: Primary | ICD-10-CM

## 2025-02-03 DIAGNOSIS — M47.817 LUMBOSACRAL SPONDYLOSIS WITHOUT MYELOPATHY: ICD-10-CM

## 2025-02-03 DIAGNOSIS — D72.819 LEUKOPENIA, UNSPECIFIED TYPE: ICD-10-CM

## 2025-02-03 PROCEDURE — 99204 OFFICE O/P NEW MOD 45 MIN: CPT | Performed by: PAIN MEDICINE

## 2025-02-03 ASSESSMENT — ENCOUNTER SYMPTOMS: BACK PAIN: 1

## 2025-02-03 NOTE — PROGRESS NOTES
HPI:     Back Pain  This is a chronic problem. The current episode started more than 1 year ago. The problem occurs intermittently. The problem is unchanged. The pain is present in the lumbar spine. The quality of the pain is described as aching. The pain radiates to the right foot. The pain is Worse during the night. The symptoms are aggravated by position, twisting, lying down, bending, standing, sitting and coughing. He has tried home exercises, bed rest, walking, heat, ice, muscle relaxant, analgesics and NSAIDs for the symptoms.     Pain in the low back down the right leg.  MRI with multilevel degenerative changes and disc herniation with suspected impingement of the right L5 nerve root.  He has been doing physical therapy.  He has seen spine surgeon.  Does follow with oncology for low white blood cell count.    Pain ranges from a 5/10 to a 10/10 depending on activity.    Patient denies any new neurological symptoms. Nobowel or bladder incontinence, no weakness, and no falling.    Review of OARRS does not show any aberrant prescription behavior. Medication is helping the patient stay active. Patient denies any side effects and reports adequate analgesia. No sign of misuse/abuse.    Past Medical History:   Diagnosis Date    Arthritis     Heartburn     Hyperlipidemia     Leukemia (HCC)     Plantar fasciitis, bilateral        Past Surgical History:   Procedure Laterality Date    COLONOSCOPY      COLONOSCOPY N/A 2/19/2018    COLONOSCOPY WITH BIOPSY performed by Aroldo Cabrera MD at [facility] OR    CT BIOPSY BONE MARROW  2/13/2024    CT BONE MARROW BIOPSY 2/13/2024 [facility] CT SCAN    ELBOW SURGERY Right     HERNIA REPAIR Bilateral 2006    VA COLON CA SCRN NOT HI RSK IND N/A 12/15/2017    COLONOSCOPY performed by Aroldo Cabrera MD at [facility] OR    VA EXCISION TUMOR SOFT TISSUE BACK/FLANK SUBQ <3CM N/A 12/1/2017    BACK LESION EXCISION UPPER x2 performed by Aroldo Cabrera MD at [facility] OR    SKIN GRAFT      finger

## 2025-02-03 NOTE — PROGRESS NOTES
HPI:     HPI        Pain ranges from a ***/10 to a ***/10 depending on activity.    Patient denies any new neurological symptoms. No bowel or bladder incontinence, no weakness, and no falling.    Review of OARRS does not show any aberrant prescription behavior. Medication is helping the patient stay active. Patient denies any side effects and reports adequate analgesia. No sign of misuse/abuse.    Past Medical History:   Diagnosis Date    Arthritis     Heartburn     Hyperlipidemia     Leukemia (HCC)     Plantar fasciitis, bilateral        Past Surgical History:   Procedure Laterality Date    COLONOSCOPY      COLONOSCOPY N/A 2/19/2018    COLONOSCOPY WITH BIOPSY performed by Aroldo Cabrera MD at [facility] OR    CT BIOPSY BONE MARROW  2/13/2024    CT BONE MARROW BIOPSY 2/13/2024 [facility] CT SCAN    ELBOW SURGERY Right     HERNIA REPAIR Bilateral 2006    RI COLON CA SCRN NOT HI RSK IND N/A 12/15/2017    COLONOSCOPY performed by Aroldo Cabrera MD at [facility] OR    RI EXCISION TUMOR SOFT TISSUE BACK/FLANK SUBQ <3CM N/A 12/1/2017    BACK LESION EXCISION UPPER x2 performed by Aroldo Cabrera MD at [facility] OR    SKIN GRAFT      finger    TONSILLECTOMY      VASCULAR SURGERY      VEIN SURGERY Bilateral     legs       No Known Allergies      Current Outpatient Medications:     gabapentin (NEURONTIN) 300 MG capsule, Take 1 capsule by mouth 3 times daily for 180 days. Intended supply: 90 days, Disp: 270 capsule, Rfl: 1    ibuprofen (ADVIL;MOTRIN) 800 MG tablet, Take 1 tablet by mouth 3 times daily as needed, Disp: , Rfl:     rosuvastatin (CRESTOR) 20 MG tablet, TAKE 1 TABLET BY MOUTH DAILY, Disp: 90 tablet, Rfl: 1    Family History   Problem Relation Age of Onset    Cancer Mother         colon cancer       Social History     Socioeconomic History    Marital status:      Spouse name: Not on file    Number of children: Not on file    Years of education: Not on file    Highest education level: Not on file   Occupational History    Not

## 2025-02-05 ENCOUNTER — HOSPITAL ENCOUNTER (OUTPATIENT)
Age: 60
Discharge: HOME | End: 2025-02-05
Payer: COMMERCIAL

## 2025-02-05 DIAGNOSIS — D46.9 MDS (MYELODYSPLASTIC SYNDROME) (HCC): ICD-10-CM

## 2025-02-05 LAB
BASOPHILS # BLD: 0.02 K/UL (ref 0–0.2)
BASOPHILS NFR BLD: 1 % (ref 0–2)
BLASTS ABSOLUTE COUNT: 0.07 K/UL
BLASTS: 3 %
EOSINOPHIL # BLD: 0.05 K/UL (ref 0–0.4)
EOSINOPHILS RELATIVE PERCENT: 2 % (ref 1–4)
ERYTHROCYTE [DISTWIDTH] IN BLOOD BY AUTOMATED COUNT: 14 % (ref 11.8–14.4)
HCT VFR BLD AUTO: 32.4 % (ref 40.7–50.3)
HGB BLD-MCNC: 10.1 G/DL (ref 13–17)
IMM GRANULOCYTES # BLD AUTO: 0 K/UL (ref 0–0.3)
IMM GRANULOCYTES NFR BLD: 0 %
LYMPHOCYTES NFR BLD: 1.22 K/UL (ref 1–4.8)
LYMPHOCYTES RELATIVE PERCENT: 53 % (ref 24–44)
MCH RBC QN AUTO: 32.3 PG (ref 25.2–33.5)
MCHC RBC AUTO-ENTMCNC: 31.2 G/DL (ref 28.4–34.8)
MCV RBC AUTO: 103.5 FL (ref 82.6–102.9)
MONOCYTES NFR BLD: 0.23 K/UL (ref 0.1–0.8)
MONOCYTES NFR BLD: 10 % (ref 1–7)
MORPHOLOGY: NORMAL
NEUTROPHILS NFR BLD: 31 % (ref 36–66)
NEUTS SEG NFR BLD: 0.71 K/UL (ref 1.8–7.7)
NRBC BLD-RTO: 0 PER 100 WBC
PLATELET # BLD AUTO: ABNORMAL K/UL (ref 138–453)
PLATELET, FLUORESCENCE: 133 K/UL (ref 138–453)
PLATELETS.RETICULATED NFR BLD AUTO: 2.2 % (ref 1.1–10.3)
RBC # BLD AUTO: 3.13 M/UL (ref 4.21–5.77)
WBC OTHER # BLD: 2.3 K/UL (ref 3.5–11.3)

## 2025-02-05 PROCEDURE — 85055 RETICULATED PLATELET ASSAY: CPT

## 2025-02-05 PROCEDURE — 36415 COLL VENOUS BLD VENIPUNCTURE: CPT

## 2025-02-05 PROCEDURE — 85025 COMPLETE CBC W/AUTO DIFF WBC: CPT

## 2025-02-07 ENCOUNTER — OFFICE VISIT (OUTPATIENT)
Dept: ONCOLOGY | Age: 60
End: 2025-02-07
Payer: COMMERCIAL

## 2025-02-07 VITALS
HEART RATE: 57 BPM | TEMPERATURE: 97.1 F | DIASTOLIC BLOOD PRESSURE: 81 MMHG | WEIGHT: 245 LBS | BODY MASS INDEX: 30.62 KG/M2 | SYSTOLIC BLOOD PRESSURE: 124 MMHG | RESPIRATION RATE: 18 BRPM | OXYGEN SATURATION: 97 %

## 2025-02-07 DIAGNOSIS — D70.9 NEUTROPENIA, UNSPECIFIED TYPE (HCC): ICD-10-CM

## 2025-02-07 DIAGNOSIS — D46.9 MDS (MYELODYSPLASTIC SYNDROME) (HCC): Primary | ICD-10-CM

## 2025-02-07 PROCEDURE — 99211 OFF/OP EST MAY X REQ PHY/QHP: CPT | Performed by: INTERNAL MEDICINE

## 2025-02-07 PROCEDURE — 99215 OFFICE O/P EST HI 40 MIN: CPT | Performed by: INTERNAL MEDICINE

## 2025-02-07 NOTE — PROGRESS NOTES
Fabrice Cruz Sr.                                                                                                                  2/7/2025  MRN:   [id number]  YOB: 1965  PCP:                           Chuy Clemente MD  Referring Physician: No ref. 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  Patient is complaining of neuropathic back pain.  Continues to work.  Denies any infections.  Denies spontaneous bruising.  Denies any bleeding.    During this visit patient's allergy, social, medical, surgical history and medications were reviewed and updated.    Summary of Case/History:  Fabrice Cruz Sr. a 59 y.o.male is a patient with longstanding history of leukopenia presents to the clinic to establish care and for further workup and evaluation.  Patient has longstanding history of [place] which has been worked in the past patient also has been to [facility] however workup did not reveal any particular etiology of the leukopenia.  Patient denies any recurrent infections.  More recently patient also has developed macrocytic anemia patient's hemoglobin was 12.4 with MCV of 93 back in February 2018.  Patient CBC from December 2023 shows hemoglobin of 10.8 with MCV of 109.  Patient denies any heavy alcohol consumption.  Patient has had bone marrow biopsy done in the past we did not reveal any etiology of the leukopenia.  Denies any history of liver disease.  Patient denies any weight loss drenching night sweats

## 2025-02-28 RX ORDER — ROSUVASTATIN CALCIUM 20 MG/1
20 TABLET, COATED ORAL DAILY
Qty: 90 TABLET | Refills: 1 | Status: SHIPPED | OUTPATIENT
Start: 2025-02-28

## 2025-03-05 RX ORDER — ROSUVASTATIN CALCIUM 20 MG/1
20 TABLET, COATED ORAL DAILY
Qty: 90 TABLET | Refills: 1 | Status: SHIPPED | OUTPATIENT
Start: 2025-03-05

## 2025-03-17 ENCOUNTER — OFFICE VISIT (OUTPATIENT)
Dept: PAIN MANAGEMENT | Age: 60
End: 2025-03-17
Payer: COMMERCIAL

## 2025-03-17 VITALS — WEIGHT: 245 LBS | BODY MASS INDEX: 30.46 KG/M2 | HEIGHT: 75 IN

## 2025-03-17 DIAGNOSIS — M54.17 LUMBOSACRAL NEURITIS: Primary | ICD-10-CM

## 2025-03-17 DIAGNOSIS — M47.817 LUMBOSACRAL SPONDYLOSIS WITHOUT MYELOPATHY: ICD-10-CM

## 2025-03-17 PROCEDURE — 99214 OFFICE O/P EST MOD 30 MIN: CPT | Performed by: PAIN MEDICINE

## 2025-03-17 RX ORDER — GABAPENTIN 300 MG/1
300 CAPSULE ORAL 3 TIMES DAILY
Qty: 90 CAPSULE | Refills: 2 | Status: SHIPPED | OUTPATIENT
Start: 2025-03-17 | End: 2025-04-16

## 2025-03-17 ASSESSMENT — ENCOUNTER SYMPTOMS: BACK PAIN: 1

## 2025-03-17 NOTE — PROGRESS NOTES
HPI:     Back Pain  This is a chronic problem. The current episode started more than 1 year ago. The problem occurs intermittently. The problem is unchanged. The pain is present in the lumbar spine. The quality of the pain is described as aching. The pain radiates to the right foot. The pain is severe. The pain is Worse during the day. The symptoms are aggravated by position, sitting, standing, twisting and bending. Risk factors include history of cancer. He has tried heat, ice, NSAIDs, analgesics, home exercises and walking for the symptoms.     Has been doing physical therapy with benefit.  Was on gabapentin 300 mg 3 times a day with good benefit.  Denies any side effects.  Following with the [facility] for MDS.  Last WBC 2.3.  He is established with orthopedic spine surgeon, Dr. Tse.    Pain ranges from a 1/10 to a 10/10 depending on activity.    Patient denies any new neurological symptoms. Nobowel or bladder incontinence, no weakness, and no falling.    Review of OARRS does not show any aberrant prescription behavior.     Past Medical History:   Diagnosis Date    Arthritis     Heartburn     Hyperlipidemia     Leukemia (HCC)     Plantar fasciitis, bilateral        Past Surgical History:   Procedure Laterality Date    COLONOSCOPY      COLONOSCOPY N/A 2/19/2018    COLONOSCOPY WITH BIOPSY performed by Aroldo Cabrera MD at [facility] OR    CT BIOPSY BONE MARROW  2/13/2024    CT BONE MARROW BIOPSY 2/13/2024 [facility] CT SCAN    ELBOW SURGERY Right     HERNIA REPAIR Bilateral 2006    ND COLON CA SCRN NOT HI RSK IND N/A 12/15/2017    COLONOSCOPY performed by Aroldo Cabrera MD at [facility] OR    ND EXCISION TUMOR SOFT TISSUE BACK/FLANK SUBQ <3CM N/A 12/1/2017    BACK LESION EXCISION UPPER x2 performed by Aroldo Cabrera MD at [facility] OR    SKIN GRAFT      finger    TONSILLECTOMY      VASCULAR SURGERY      VEIN SURGERY Bilateral     legs       No Known Allergies      Current Outpatient Medications:     gabapentin

## 2025-03-21 ENCOUNTER — HOSPITAL ENCOUNTER (OUTPATIENT)
Age: 60
Discharge: HOME | End: 2025-03-21
Payer: COMMERCIAL

## 2025-03-21 DIAGNOSIS — D70.9 NEUTROPENIA, UNSPECIFIED TYPE: ICD-10-CM

## 2025-03-21 DIAGNOSIS — D46.9 MDS (MYELODYSPLASTIC SYNDROME) (HCC): ICD-10-CM

## 2025-03-21 LAB
BASOPHILS # BLD: 0 K/UL (ref 0–0.2)
BASOPHILS NFR BLD: 0 % (ref 0–2)
BLASTS ABSOLUTE COUNT: 0.13 K/UL
BLASTS: 5 %
EOSINOPHIL # BLD: 0 K/UL (ref 0–0.4)
EOSINOPHILS RELATIVE PERCENT: 0 % (ref 1–4)
ERYTHROCYTE [DISTWIDTH] IN BLOOD BY AUTOMATED COUNT: 14.1 % (ref 11.8–14.4)
HCT VFR BLD AUTO: 36.8 % (ref 40.7–50.3)
HGB BLD-MCNC: 11.3 G/DL (ref 13–17)
IMM GRANULOCYTES # BLD AUTO: 0 K/UL (ref 0–0.3)
IMM GRANULOCYTES NFR BLD: 0 %
LYMPHOCYTES NFR BLD: 1.24 K/UL (ref 1–4.8)
LYMPHOCYTES RELATIVE PERCENT: 50 % (ref 24–44)
MCH RBC QN AUTO: 31.8 PG (ref 25.2–33.5)
MCHC RBC AUTO-ENTMCNC: 30.7 G/DL (ref 28.4–34.8)
MCV RBC AUTO: 103.7 FL (ref 82.6–102.9)
MONOCYTES NFR BLD: 0.1 K/UL (ref 0.1–0.8)
MONOCYTES NFR BLD: 4 % (ref 1–7)
MORPHOLOGY: ABNORMAL
NEUTROPHILS NFR BLD: 41 % (ref 36–66)
NEUTS SEG NFR BLD: 1.03 K/UL (ref 1.8–7.7)
NRBC BLD-RTO: 0 PER 100 WBC
PLATELET # BLD AUTO: 149 K/UL (ref 138–453)
PMV BLD AUTO: 9.5 FL (ref 8.1–13.5)
RBC # BLD AUTO: 3.55 M/UL (ref 4.21–5.77)
WBC OTHER # BLD: 2.5 K/UL (ref 3.5–11.3)

## 2025-03-21 PROCEDURE — 85025 COMPLETE CBC W/AUTO DIFF WBC: CPT

## 2025-03-21 PROCEDURE — 36415 COLL VENOUS BLD VENIPUNCTURE: CPT

## 2025-03-26 ENCOUNTER — OFFICE VISIT (OUTPATIENT)
Dept: ONCOLOGY | Age: 60
End: 2025-03-26
Payer: COMMERCIAL

## 2025-03-26 ENCOUNTER — TELEPHONE (OUTPATIENT)
Dept: ONCOLOGY | Age: 60
End: 2025-03-26

## 2025-03-26 VITALS
RESPIRATION RATE: 18 BRPM | OXYGEN SATURATION: 94 % | BODY MASS INDEX: 31.29 KG/M2 | TEMPERATURE: 97 F | WEIGHT: 250.3 LBS | HEART RATE: 68 BPM | SYSTOLIC BLOOD PRESSURE: 123 MMHG | DIASTOLIC BLOOD PRESSURE: 79 MMHG

## 2025-03-26 DIAGNOSIS — D46.9 MDS (MYELODYSPLASTIC SYNDROME) (HCC): Primary | ICD-10-CM

## 2025-03-26 DIAGNOSIS — D70.9 NEUTROPENIA, UNSPECIFIED TYPE: ICD-10-CM

## 2025-03-26 PROCEDURE — 99214 OFFICE O/P EST MOD 30 MIN: CPT | Performed by: INTERNAL MEDICINE

## 2025-03-26 PROCEDURE — 99211 OFF/OP EST MAY X REQ PHY/QHP: CPT | Performed by: INTERNAL MEDICINE

## 2025-03-26 NOTE — TELEPHONE ENCOUNTER
Instructions   from Dr. Jaun Haines MD    Bone marrow biopsy ( not [facility] per pt request )     Referral to Interventional Radiology

## 2025-03-26 NOTE — PROGRESS NOTES
Fabrice Cruz Sr.                                                                                                                  3/26/2025  MRN:   [id number]  YOB: 1965  PCP:                           Chuy Clemente MD  Referring Physician: No ref. 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 Dr. Valadez is scheduled to discuss the results via video call in mid-April 2025. Additionally, the presence of blasts in the blood was reported. The last bone marrow biopsy was performed over a year ago. Dissatisfaction with the numbing process during the previous biopsy at [facility] was expressed, and a preference not to return there for future procedures was noted. Referral to a bone marrow transplant specialist has not been made by Dr. Valadez.  During this visit patient's allergy, social, medical, surgical history and medications were reviewed and updated.    Summary of Case/History:  Fabrice Cruz Sr. a 59 y.o.male is a patient with longstanding history of leukopenia presents to the clinic to establish care and for further workup and evaluation.  Patient has longstanding history of [place] which has been worked in the past patient also has been to [facility] however workup did not reveal any particular etiology of the leukopenia.  Patient denies any recurrent infections.  More recently patient also has

## 2025-03-31 ENCOUNTER — TELEPHONE (OUTPATIENT)
Dept: INFUSION THERAPY | Age: 60
End: 2025-03-31

## 2025-03-31 NOTE — TELEPHONE ENCOUNTER
Clearance faxed to [phone].  Confirmation received.  Patient will need antibiotics with dental work up.

## 2025-04-07 ENCOUNTER — HOSPITAL ENCOUNTER (OUTPATIENT)
Dept: CT IMAGING | Age: 60
Discharge: HOME | End: 2025-04-09
Payer: COMMERCIAL

## 2025-04-07 VITALS
TEMPERATURE: 97.3 F | HEART RATE: 68 BPM | OXYGEN SATURATION: 94 % | RESPIRATION RATE: 16 BRPM | SYSTOLIC BLOOD PRESSURE: 126 MMHG | DIASTOLIC BLOOD PRESSURE: 83 MMHG

## 2025-04-07 DIAGNOSIS — D64.9 RELATIVE ANEMIA: ICD-10-CM

## 2025-04-07 LAB
INR PPP: 1
PARTIAL THROMBOPLASTIN TIME: 30.8 SEC (ref 23–36.5)
PLATELET # BLD AUTO: 139 K/UL (ref 138–453)
PROTHROMBIN TIME: 13.7 SEC (ref 11.7–14.9)

## 2025-04-07 PROCEDURE — 88305 TISSUE EXAM BY PATHOLOGIST: CPT

## 2025-04-07 PROCEDURE — 88264 CHROMOSOME ANALYSIS 20-25: CPT

## 2025-04-07 PROCEDURE — 85049 AUTOMATED PLATELET COUNT: CPT

## 2025-04-07 PROCEDURE — 85730 THROMBOPLASTIN TIME PARTIAL: CPT

## 2025-04-07 PROCEDURE — 88313 SPECIAL STAINS GROUP 2: CPT

## 2025-04-07 PROCEDURE — 7100000040 HC SPAR PHASE II RECOVERY - FIRST 15 MIN: Performed by: RADIOLOGY

## 2025-04-07 PROCEDURE — 88184 FLOWCYTOMETRY/ TC 1 MARKER: CPT

## 2025-04-07 PROCEDURE — 2709999900 CT BIOPSY BONE MARROW

## 2025-04-07 PROCEDURE — 6360000002 HC RX W HCPCS: Performed by: RADIOLOGY

## 2025-04-07 PROCEDURE — 88185 FLOWCYTOMETRY/TC ADD-ON: CPT

## 2025-04-07 PROCEDURE — 85610 PROTHROMBIN TIME: CPT

## 2025-04-07 PROCEDURE — 88342 IMHCHEM/IMCYTCHM 1ST ANTB: CPT

## 2025-04-07 PROCEDURE — 88280 CHROMOSOME KARYOTYPE STUDY: CPT

## 2025-04-07 PROCEDURE — 88341 IMHCHEM/IMCYTCHM EA ADD ANTB: CPT

## 2025-04-07 PROCEDURE — 88360 TUMOR IMMUNOHISTOCHEM/MANUAL: CPT

## 2025-04-07 PROCEDURE — 88237 TISSUE CULTURE BONE MARROW: CPT

## 2025-04-07 PROCEDURE — 88311 DECALCIFY TISSUE: CPT

## 2025-04-07 PROCEDURE — 7100000041 HC SPAR PHASE II RECOVERY - ADDTL 15 MIN: Performed by: RADIOLOGY

## 2025-04-07 RX ORDER — SODIUM CHLORIDE 9 MG/ML
INJECTION, SOLUTION INTRAVENOUS CONTINUOUS
Status: DISCONTINUED | OUTPATIENT
Start: 2025-04-07 | End: 2025-04-10 | Stop reason: HOSPADM

## 2025-04-07 RX ORDER — MIDAZOLAM HYDROCHLORIDE 5 MG/ML
INJECTION INTRAMUSCULAR; INTRAVENOUS PRN
Status: COMPLETED | OUTPATIENT
Start: 2025-04-07 | End: 2025-04-07

## 2025-04-07 RX ORDER — FENTANYL CITRATE 50 UG/ML
INJECTION, SOLUTION INTRAMUSCULAR; INTRAVENOUS PRN
Status: COMPLETED | OUTPATIENT
Start: 2025-04-07 | End: 2025-04-07

## 2025-04-07 RX ORDER — ACETAMINOPHEN 325 MG/1
650 TABLET ORAL EVERY 4 HOURS PRN
Status: DISCONTINUED | OUTPATIENT
Start: 2025-04-07 | End: 2025-04-10 | Stop reason: HOSPADM

## 2025-04-07 RX ADMIN — MIDAZOLAM 1 MG: 5 INJECTION INTRAMUSCULAR; INTRAVENOUS at 11:19

## 2025-04-07 RX ADMIN — FENTANYL CITRATE 50 MCG: 50 INJECTION, SOLUTION INTRAMUSCULAR; INTRAVENOUS at 11:16

## 2025-04-07 RX ADMIN — FENTANYL CITRATE 50 MCG: 50 INJECTION, SOLUTION INTRAMUSCULAR; INTRAVENOUS at 11:08

## 2025-04-07 RX ADMIN — MIDAZOLAM 1 MG: 5 INJECTION INTRAMUSCULAR; INTRAVENOUS at 11:22

## 2025-04-07 NOTE — POST SEDATION
Sedation Post Procedure Note    Patient Name: Fabrice Cruz Sr.   YOB: 1965  Room/Bed: Room/bed info not found  Medical Record Number: [id number]  Date: 4/7/2025   Time: 11:19 AM         Physicians/Assistants: KAYA Arevalo    Procedure Performed:  BMBx    Post-Sedation Vital Signs:  Vitals:    04/07/25 1115   BP: (!) 152/77   Pulse: 75   Resp: 13   Temp:    SpO2:       Vital signs were reviewed and were stable after the procedure (see flow sheet for vitals)            Post-Sedation Exam: Pt remains stable           Complications: none    Electronically signed by KAYA Arevalo on 4/7/2025 at 11:19 AM

## 2025-04-07 NOTE — BRIEF OP NOTE
Brief Postoperative Note for Bone Marrow Biopsy    Fabrice Cruz Sr.  YOB: 1965  [id number]    Pre-operative Diagnosis: Anemia     Post-operative Diagnosis: Same     Procedure: Left posterior iliac bone marrow aspiration with and without heparin & biopsy     Anesthesia: 1% lidocaine and moderate sedation     Surgeons/Assistants: MD Bhavin; KAYA Lew     Estimated Blood Loss: Minimal       Complications: None     Specimens Were Obtained: from the Left posterior iliac spine using an Arrow Oncontrol Device 11 g x 4 in. Marrow was collected  with and without heparin. A bone biopsy was also obtained. Specimens were received by hem/onc at the time of collection. Vital signs were reviewed and were stable after the procedure.     Electronically signed by KAYA Arevalo on 4/7/2025 at 11:20 AM

## 2025-04-07 NOTE — OR NURSING
Hematology present. Bone marrow and aspiration completed. Access out and bandage on. Report called and patient back to [facility].

## 2025-04-10 LAB
BONE MARROW REPORT: NORMAL
FLOW CYTOMETRY, BM: NORMAL

## 2025-04-16 LAB — CHROMOSOME STUDY: NORMAL

## 2025-05-07 DIAGNOSIS — D46.9 MDS (MYELODYSPLASTIC SYNDROME) (HCC): Primary | ICD-10-CM

## 2025-05-08 ENCOUNTER — TELEPHONE (OUTPATIENT)
Age: 60
End: 2025-05-08

## 2025-05-08 NOTE — TELEPHONE ENCOUNTER
----- Message from Dr. Jaun Haines MD sent at 5/7/2025  5:21 PM EDT -----  Patient had a bone marrow done on 4/7.  Not sure why he does not have a follow-up.  Currently scheduled follow-up if possible this Friday otherwise next week

## 2025-05-16 ENCOUNTER — TELEPHONE (OUTPATIENT)
Age: 60
End: 2025-05-16

## 2025-05-16 ENCOUNTER — OFFICE VISIT (OUTPATIENT)
Age: 60
End: 2025-05-16

## 2025-05-16 ENCOUNTER — HOSPITAL ENCOUNTER (OUTPATIENT)
Age: 60
Discharge: HOME | End: 2025-05-16
Payer: COMMERCIAL

## 2025-05-16 VITALS
DIASTOLIC BLOOD PRESSURE: 84 MMHG | TEMPERATURE: 97.3 F | BODY MASS INDEX: 30.81 KG/M2 | RESPIRATION RATE: 18 BRPM | SYSTOLIC BLOOD PRESSURE: 143 MMHG | HEART RATE: 82 BPM | WEIGHT: 246.5 LBS

## 2025-05-16 DIAGNOSIS — D46.9 MDS (MYELODYSPLASTIC SYNDROME) (HCC): Primary | ICD-10-CM

## 2025-05-16 DIAGNOSIS — D46.9 MDS (MYELODYSPLASTIC SYNDROME) (HCC): ICD-10-CM

## 2025-05-16 LAB
BASOPHILS # BLD: 0 K/UL (ref 0–0.2)
BASOPHILS NFR BLD: 1 % (ref 0–2)
EOSINOPHIL # BLD: 0.1 K/UL (ref 0–0.4)
EOSINOPHILS RELATIVE PERCENT: 2 % (ref 1–4)
ERYTHROCYTE [DISTWIDTH] IN BLOOD BY AUTOMATED COUNT: 14.1 % (ref 12.5–15.4)
HCT VFR BLD AUTO: 35.6 % (ref 41–53)
HGB BLD-MCNC: 11.4 G/DL (ref 13.5–17.5)
LYMPHOCYTES NFR BLD: 1.3 K/UL (ref 1–4.8)
LYMPHOCYTES RELATIVE PERCENT: 46 % (ref 24–44)
MCH RBC QN AUTO: 31.8 PG (ref 26–34)
MCHC RBC AUTO-ENTMCNC: 32 G/DL (ref 31–37)
MCV RBC AUTO: 99.5 FL (ref 80–100)
MONOCYTES NFR BLD: 0.3 K/UL (ref 0.1–1.2)
MONOCYTES NFR BLD: 10 % (ref 2–11)
NEUTROPHILS NFR BLD: 41 % (ref 36–66)
NEUTS SEG NFR BLD: 1.1 K/UL (ref 1.8–7.7)
PLATELET # BLD AUTO: 139 K/UL (ref 140–450)
PMV BLD AUTO: 7.4 FL (ref 6–12)
RBC # BLD AUTO: 3.57 M/UL (ref 4.5–5.9)
WBC OTHER # BLD: 2.8 K/UL (ref 3.5–11)

## 2025-05-16 PROCEDURE — 85025 COMPLETE CBC W/AUTO DIFF WBC: CPT

## 2025-05-16 PROCEDURE — 36415 COLL VENOUS BLD VENIPUNCTURE: CPT

## 2025-05-16 NOTE — PROGRESS NOTES
approximately 5% of marrow  cellularity.: Stains increased myeloid precursors and scattered  mast cells.E-cadherin: Stains scattered erythroid precursors.CD61:  Stains megakaryocytes with dysplastic morphology.    Note: All special stain controls are appropriately reactive.    Immunohistochemical stains were performed in addition to flow  cytometric findings in order to assess the immunoarchitecture required  for diagnosis and/or assessing immunostains not available by  immunophenotyping.FLOW CYTOMETRIC IMMUNOPHENOTYPING, BONE MARROW  ASPIRATE:     Bone marrow, flow cytometric immunophenotyping:     Negative for B-cell monoclonality and T-cell aberrancy.     Myeloblasts comprise 2.4% of the total leukocytes.    Sample t ype:  Bone marrow     Flow cytometric immunophenotyping analysis is performed on bone marrow  aspirate following rbc lysis procedure.  The cells are labeled by  direct ten color immunostaining procedure and analyzed on a Labcyteios  flow cytometer.     Sample cytocentrifuge differential cell count  5.1% Lymph  3.4% Monos  68.3% Myeloid cells  23.2% Other          Viability:  >95%     Marker Panels:     B-cell Tube:  CD5, CD10, CD19, CD20, CD22, CD23, CD45, , Kappa,  Lambda  T-cell Tube:  CD2, CD3, CD4, CD5, CD7, CD8, CD16, CD45, CD56, CD57     Myeloid Tube 1:  CD13, CD33, CD34, CD38, CD45, CD71, , HLADR     This leuk/lymph immunophenotyping test was developed and its  performance characteristics determined by [facility].  It has not been cleared or approved by the U.S.  Food and Drug Administration.  The FDA has determined that such  clearance or approval is not necessary.  This test is used for  clinical purposes.  It should not be regarded as i nvestigational or  for research.  This laboratory is certified under the Clinical  Laboratory Improvement Amendments of 1988 (CLIA) as qualified to  perform high complexity clinical laboratory testing.

## 2025-05-27 ENCOUNTER — OFFICE VISIT (OUTPATIENT)
Age: 60
End: 2025-05-27
Payer: COMMERCIAL

## 2025-05-27 ENCOUNTER — TELEPHONE (OUTPATIENT)
Dept: INFUSION THERAPY | Age: 60
End: 2025-05-27

## 2025-05-27 DIAGNOSIS — D46.9 MDS (MYELODYSPLASTIC SYNDROME) (HCC): Primary | ICD-10-CM

## 2025-05-27 PROCEDURE — 99213 OFFICE O/P EST LOW 20 MIN: CPT | Performed by: INTERNAL MEDICINE

## 2025-05-27 RX ORDER — ONDANSETRON 8 MG/1
8 TABLET, ORALLY DISINTEGRATING ORAL EVERY 8 HOURS PRN
Qty: 90 TABLET | Refills: 3 | Status: SHIPPED | OUTPATIENT
Start: 2025-05-27

## 2025-05-27 NOTE — TELEPHONE ENCOUNTER
Chemotherapy orders received:    Cw=183.5 cm Sa=351.8 kg BSA=2.43  Chemotherapy doses verified:     Vidaza 75 mg/m2 = 182 mg    Lina Argueta RN

## 2025-05-27 NOTE — PROGRESS NOTES
Fabrice and his wife, Nuzhat, were here for chemotherapy teaching. Spent 45 minutes with them.  Teaching sheets from [facility] and verbal information given on chemotherapy agents, action, administration and side effects.    Chemotherapy medications discussed: vidaza    Pregnancy warnings reviewed: N/A    Chemotherapy consent form signed by patient.    Provided new patient binder, Chemotherapy and You booklet, Eating Hints booklet      Port placement: N/A    Reviewed take home medication: N/A    Questions answered and support given.    [facility] rehab referral assessment form, distress tool form and PG-SGA form completed by patient.    Needs that were identified during teaching visit: No needs identified at this time.    Discussed community resources such as [employer], [employer] [facility], [facility], [employer], [employer], etc.    Pt will return on 6/2/25 for C1D1 vidaza and f/u with Dr. Haines on 6/4/25.

## 2025-05-28 NOTE — TELEPHONE ENCOUNTER
Chemotherapy orders received:     Fn=648.5 cm Fb=307.8 kg BSA=2.43  Chemotherapy doses verified:      Vidaza 75 mg/m2 = 182 mg     Sybil Blanca RN

## 2025-05-29 ENCOUNTER — CLINICAL DOCUMENTATION (OUTPATIENT)
Age: 60
End: 2025-05-29

## 2025-05-29 NOTE — PROGRESS NOTES
[facility] Nutrition Screen:    PG-SGA screening form reviewed. Score = 0. RD referral/nutrition evaluation indicated for scores > 4.   Please consult oncology dietitian with any future nutrition concerns/needs.

## 2025-05-31 DIAGNOSIS — D46.9 MDS (MYELODYSPLASTIC SYNDROME) (HCC): Primary | ICD-10-CM

## 2025-05-31 RX ORDER — MEPERIDINE HYDROCHLORIDE 50 MG/ML
12.5 INJECTION INTRAMUSCULAR; INTRAVENOUS; SUBCUTANEOUS PRN
OUTPATIENT
Start: 2025-05-31

## 2025-05-31 RX ORDER — SODIUM CHLORIDE 9 MG/ML
INJECTION, SOLUTION INTRAVENOUS CONTINUOUS
OUTPATIENT
Start: 2025-05-31

## 2025-05-31 RX ORDER — SODIUM CHLORIDE 0.9 % (FLUSH) 0.9 %
5-40 SYRINGE (ML) INJECTION PRN
OUTPATIENT
Start: 2025-05-31

## 2025-05-31 RX ORDER — EPINEPHRINE 1 MG/ML
0.3 INJECTION, SOLUTION, CONCENTRATE INTRAVENOUS PRN
OUTPATIENT
Start: 2025-05-31

## 2025-05-31 RX ORDER — FAMOTIDINE 10 MG/ML
20 INJECTION, SOLUTION INTRAVENOUS
OUTPATIENT
Start: 2025-05-31

## 2025-05-31 RX ORDER — DIPHENHYDRAMINE HYDROCHLORIDE 50 MG/ML
50 INJECTION, SOLUTION INTRAMUSCULAR; INTRAVENOUS
OUTPATIENT
Start: 2025-05-31

## 2025-05-31 RX ORDER — ACETAMINOPHEN 325 MG/1
650 TABLET ORAL
OUTPATIENT
Start: 2025-05-31

## 2025-05-31 RX ORDER — ONDANSETRON 4 MG/1
8 TABLET, ORALLY DISINTEGRATING ORAL ONCE
OUTPATIENT
Start: 2025-05-31 | End: 2025-05-31

## 2025-05-31 RX ORDER — HEPARIN SODIUM (PORCINE) LOCK FLUSH IV SOLN 100 UNIT/ML 100 UNIT/ML
500 SOLUTION INTRAVENOUS PRN
OUTPATIENT
Start: 2025-05-31

## 2025-05-31 RX ORDER — ONDANSETRON 2 MG/ML
8 INJECTION INTRAMUSCULAR; INTRAVENOUS
OUTPATIENT
Start: 2025-05-31

## 2025-05-31 RX ORDER — ALBUTEROL SULFATE 90 UG/1
4 INHALANT RESPIRATORY (INHALATION) PRN
OUTPATIENT
Start: 2025-05-31

## 2025-05-31 RX ORDER — SODIUM CHLORIDE 9 MG/ML
5-250 INJECTION, SOLUTION INTRAVENOUS PRN
OUTPATIENT
Start: 2025-05-31

## 2025-05-31 RX ORDER — HYDROCORTISONE SODIUM SUCCINATE 100 MG/2ML
100 INJECTION INTRAMUSCULAR; INTRAVENOUS
OUTPATIENT
Start: 2025-05-31

## 2025-06-02 ENCOUNTER — SOCIAL WORK (OUTPATIENT)
Age: 60
End: 2025-06-02

## 2025-06-02 ENCOUNTER — HOSPITAL ENCOUNTER (OUTPATIENT)
Dept: INFUSION THERAPY | Age: 60
Discharge: HOME | End: 2025-06-02
Payer: COMMERCIAL

## 2025-06-02 VITALS
TEMPERATURE: 98 F | SYSTOLIC BLOOD PRESSURE: 123 MMHG | WEIGHT: 246.8 LBS | DIASTOLIC BLOOD PRESSURE: 74 MMHG | HEART RATE: 66 BPM | RESPIRATION RATE: 18 BRPM | BODY MASS INDEX: 30.85 KG/M2

## 2025-06-02 DIAGNOSIS — D46.9 MDS (MYELODYSPLASTIC SYNDROME) (HCC): Primary | ICD-10-CM

## 2025-06-02 LAB
ALBUMIN SERPL-MCNC: 4.4 G/DL (ref 3.5–5.2)
ALBUMIN/GLOB SERPL: 1.5 {RATIO} (ref 1–2.5)
ALP SERPL-CCNC: 50 U/L (ref 40–129)
ALT SERPL-CCNC: 28 U/L (ref 10–50)
ANION GAP SERPL CALCULATED.3IONS-SCNC: 11 MMOL/L (ref 9–16)
AST SERPL-CCNC: 31 U/L (ref 10–50)
ATYPICAL LYMPHOCYTE ABSOLUTE COUNT: 0.07 K/UL
ATYPICAL LYMPHOCYTES: 3 %
BASOPHILS # BLD: 0 K/UL (ref 0–0.2)
BASOPHILS NFR BLD: 0 % (ref 0–2)
BILIRUB SERPL-MCNC: 0.3 MG/DL (ref 0–1.2)
BUN SERPL-MCNC: 15 MG/DL (ref 8–23)
CALCIUM SERPL-MCNC: 9.3 MG/DL (ref 8.6–10.4)
CHLORIDE SERPL-SCNC: 104 MMOL/L (ref 98–107)
CO2 SERPL-SCNC: 27 MMOL/L (ref 20–31)
CREAT SERPL-MCNC: 0.8 MG/DL (ref 0.7–1.2)
EOSINOPHIL # BLD: 0 K/UL (ref 0–0.4)
EOSINOPHILS RELATIVE PERCENT: 0 % (ref 1–4)
ERYTHROCYTE [DISTWIDTH] IN BLOOD BY AUTOMATED COUNT: 14.2 % (ref 12.5–15.4)
GFR, ESTIMATED: >90 ML/MIN/1.73M2
GLUCOSE SERPL-MCNC: 90 MG/DL (ref 74–99)
HCT VFR BLD AUTO: 35.1 % (ref 41–53)
HGB BLD-MCNC: 11.2 G/DL (ref 13.5–17.5)
LYMPHOCYTES NFR BLD: 1.11 K/UL (ref 1–4.8)
LYMPHOCYTES RELATIVE PERCENT: 46 % (ref 24–44)
MCH RBC QN AUTO: 31.8 PG (ref 26–34)
MCHC RBC AUTO-ENTMCNC: 32 G/DL (ref 31–37)
MCV RBC AUTO: 99.5 FL (ref 80–100)
MONOCYTES NFR BLD: 0.24 K/UL (ref 0.1–0.8)
MONOCYTES NFR BLD: 10 % (ref 1–7)
MORPHOLOGY: NORMAL
NEUTROPHILS NFR BLD: 41 % (ref 36–66)
NEUTS SEG NFR BLD: 0.98 K/UL (ref 1.8–7.7)
PLATELET # BLD AUTO: 127 K/UL (ref 140–450)
PMV BLD AUTO: 7.4 FL (ref 6–12)
POTASSIUM SERPL-SCNC: 4.2 MMOL/L (ref 3.7–5.3)
PROT SERPL-MCNC: 7.4 G/DL (ref 6.6–8.7)
RBC # BLD AUTO: 3.53 M/UL (ref 4.5–5.9)
SODIUM SERPL-SCNC: 142 MMOL/L (ref 136–145)
WBC OTHER # BLD: 2.4 K/UL (ref 3.5–11)

## 2025-06-02 PROCEDURE — 80053 COMPREHEN METABOLIC PANEL: CPT

## 2025-06-02 PROCEDURE — 6360000002 HC RX W HCPCS: Performed by: INTERNAL MEDICINE

## 2025-06-02 PROCEDURE — 96401 CHEMO ANTI-NEOPL SQ/IM: CPT

## 2025-06-02 PROCEDURE — 36415 COLL VENOUS BLD VENIPUNCTURE: CPT

## 2025-06-02 PROCEDURE — 6370000000 HC RX 637 (ALT 250 FOR IP): Performed by: INTERNAL MEDICINE

## 2025-06-02 PROCEDURE — 2500000003 HC RX 250 WO HCPCS: Performed by: INTERNAL MEDICINE

## 2025-06-02 PROCEDURE — 85025 COMPLETE CBC W/AUTO DIFF WBC: CPT

## 2025-06-02 RX ORDER — ONDANSETRON 4 MG/1
8 TABLET, ORALLY DISINTEGRATING ORAL ONCE
Status: COMPLETED | OUTPATIENT
Start: 2025-06-02 | End: 2025-06-02

## 2025-06-02 RX ADMIN — ONDANSETRON 8 MG: 4 TABLET, ORALLY DISINTEGRATING ORAL at 14:08

## 2025-06-02 RX ADMIN — WATER 185 MG: 1 INJECTION INTRAMUSCULAR; INTRAVENOUS; SUBCUTANEOUS at 14:45

## 2025-06-02 NOTE — PROGRESS NOTES
Pt here for C1D1 Vidaza. Labs drawn and results reviewed. Pt was treated without incident and d/c'd in stable condition. Pt will return tomorrow 6-3-25 for C1D2.

## 2025-06-02 NOTE — PROGRESS NOTES
Met with patient and discussed options for disability. Pt gave  union disability sheet, which will be filled out by doctor and given back when completed. Pt was given a step by step document on the multiple ways one could apply for SSDI, where to apply, the nearest location for Social Security Administration, as well as contact info. Pt was told if he had any issues to contact John Wisdom, and was given card for further contact.

## 2025-06-02 NOTE — PROGRESS NOTES
checked in with patient and inquired if he needed any support through the social work department. 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  stated she would meet with patient during his appointment today to further discuss details.

## 2025-06-03 ENCOUNTER — HOSPITAL ENCOUNTER (OUTPATIENT)
Dept: INFUSION THERAPY | Age: 60
Discharge: HOME | End: 2025-06-03
Payer: COMMERCIAL

## 2025-06-03 VITALS
SYSTOLIC BLOOD PRESSURE: 113 MMHG | RESPIRATION RATE: 16 BRPM | HEART RATE: 64 BPM | TEMPERATURE: 98.2 F | DIASTOLIC BLOOD PRESSURE: 70 MMHG

## 2025-06-03 DIAGNOSIS — D46.9 MDS (MYELODYSPLASTIC SYNDROME) (HCC): Primary | ICD-10-CM

## 2025-06-03 PROCEDURE — 96401 CHEMO ANTI-NEOPL SQ/IM: CPT

## 2025-06-03 PROCEDURE — 6370000000 HC RX 637 (ALT 250 FOR IP): Performed by: INTERNAL MEDICINE

## 2025-06-03 PROCEDURE — 2500000003 HC RX 250 WO HCPCS: Performed by: INTERNAL MEDICINE

## 2025-06-03 PROCEDURE — 6360000002 HC RX W HCPCS: Performed by: INTERNAL MEDICINE

## 2025-06-03 RX ORDER — ONDANSETRON 4 MG/1
8 TABLET, ORALLY DISINTEGRATING ORAL ONCE
Status: COMPLETED | OUTPATIENT
Start: 2025-06-03 | End: 2025-06-03

## 2025-06-03 RX ADMIN — WATER 185 MG: 1 INJECTION INTRAMUSCULAR; INTRAVENOUS; SUBCUTANEOUS at 13:20

## 2025-06-03 RX ADMIN — ONDANSETRON 8 MG: 4 TABLET, ORALLY DISINTEGRATING ORAL at 12:57

## 2025-06-03 NOTE — PROGRESS NOTES
Pt here for C.1D.2 Vidaza.  Arrives ambulatory.  Denies any new complaints.  Tx complete without incident.  Pt d/c'd in stable condition.  Returns 6/4 for C1D3 Vidaza.

## 2025-06-04 ENCOUNTER — TELEPHONE (OUTPATIENT)
Age: 60
End: 2025-06-04

## 2025-06-04 ENCOUNTER — HOSPITAL ENCOUNTER (OUTPATIENT)
Dept: INFUSION THERAPY | Age: 60
Discharge: HOME | End: 2025-06-04
Payer: COMMERCIAL

## 2025-06-04 ENCOUNTER — OFFICE VISIT (OUTPATIENT)
Age: 60
End: 2025-06-04

## 2025-06-04 VITALS
BODY MASS INDEX: 31.36 KG/M2 | SYSTOLIC BLOOD PRESSURE: 106 MMHG | HEART RATE: 65 BPM | WEIGHT: 252.2 LBS | OXYGEN SATURATION: 95 % | DIASTOLIC BLOOD PRESSURE: 67 MMHG | HEIGHT: 75 IN

## 2025-06-04 DIAGNOSIS — D70.9 NEUTROPENIA, UNSPECIFIED TYPE: ICD-10-CM

## 2025-06-04 DIAGNOSIS — D46.9 MDS (MYELODYSPLASTIC SYNDROME) (HCC): Primary | ICD-10-CM

## 2025-06-04 DIAGNOSIS — Z51.11 CHEMOTHERAPY MANAGEMENT, ENCOUNTER FOR: ICD-10-CM

## 2025-06-04 PROCEDURE — 6360000002 HC RX W HCPCS: Performed by: INTERNAL MEDICINE

## 2025-06-04 PROCEDURE — 96401 CHEMO ANTI-NEOPL SQ/IM: CPT

## 2025-06-04 PROCEDURE — 2500000003 HC RX 250 WO HCPCS: Performed by: INTERNAL MEDICINE

## 2025-06-04 RX ORDER — HEPARIN SODIUM (PORCINE) LOCK FLUSH IV SOLN 100 UNIT/ML 100 UNIT/ML
500 SOLUTION INTRAVENOUS PRN
OUTPATIENT
Start: 2025-07-04

## 2025-06-04 RX ORDER — EPINEPHRINE 1 MG/ML
0.3 INJECTION, SOLUTION, CONCENTRATE INTRAVENOUS PRN
OUTPATIENT
Start: 2025-07-04

## 2025-06-04 RX ORDER — SODIUM CHLORIDE 0.9 % (FLUSH) 0.9 %
5-40 SYRINGE (ML) INJECTION PRN
OUTPATIENT
Start: 2025-07-04

## 2025-06-04 RX ORDER — ACETAMINOPHEN 325 MG/1
650 TABLET ORAL
OUTPATIENT
Start: 2025-07-04

## 2025-06-04 RX ORDER — SODIUM CHLORIDE 9 MG/ML
INJECTION, SOLUTION INTRAVENOUS CONTINUOUS
OUTPATIENT
Start: 2025-06-30

## 2025-06-04 RX ORDER — ALBUTEROL SULFATE 90 UG/1
4 INHALANT RESPIRATORY (INHALATION) PRN
OUTPATIENT
Start: 2025-07-08

## 2025-06-04 RX ORDER — SODIUM CHLORIDE 9 MG/ML
INJECTION, SOLUTION INTRAVENOUS CONTINUOUS
OUTPATIENT
Start: 2025-07-08

## 2025-06-04 RX ORDER — MEPERIDINE HYDROCHLORIDE 50 MG/ML
12.5 INJECTION INTRAMUSCULAR; INTRAVENOUS; SUBCUTANEOUS PRN
OUTPATIENT
Start: 2025-07-02

## 2025-06-04 RX ORDER — HEPARIN SODIUM (PORCINE) LOCK FLUSH IV SOLN 100 UNIT/ML 100 UNIT/ML
500 SOLUTION INTRAVENOUS PRN
OUTPATIENT
Start: 2025-07-03

## 2025-06-04 RX ORDER — MEPERIDINE HYDROCHLORIDE 50 MG/ML
12.5 INJECTION INTRAMUSCULAR; INTRAVENOUS; SUBCUTANEOUS PRN
OUTPATIENT
Start: 2025-07-07

## 2025-06-04 RX ORDER — EPINEPHRINE 1 MG/ML
0.3 INJECTION, SOLUTION, CONCENTRATE INTRAVENOUS PRN
OUTPATIENT
Start: 2025-07-07

## 2025-06-04 RX ORDER — ONDANSETRON 2 MG/ML
8 INJECTION INTRAMUSCULAR; INTRAVENOUS
OUTPATIENT
Start: 2025-07-08

## 2025-06-04 RX ORDER — SODIUM CHLORIDE 0.9 % (FLUSH) 0.9 %
5-40 SYRINGE (ML) INJECTION PRN
OUTPATIENT
Start: 2025-07-08

## 2025-06-04 RX ORDER — ONDANSETRON 2 MG/ML
8 INJECTION INTRAMUSCULAR; INTRAVENOUS
OUTPATIENT
Start: 2025-07-07

## 2025-06-04 RX ORDER — SODIUM CHLORIDE 0.9 % (FLUSH) 0.9 %
5-40 SYRINGE (ML) INJECTION PRN
OUTPATIENT
Start: 2025-07-07

## 2025-06-04 RX ORDER — EPINEPHRINE 1 MG/ML
0.3 INJECTION, SOLUTION, CONCENTRATE INTRAVENOUS PRN
OUTPATIENT
Start: 2025-06-30

## 2025-06-04 RX ORDER — FAMOTIDINE 10 MG/ML
20 INJECTION, SOLUTION INTRAVENOUS
OUTPATIENT
Start: 2025-07-07

## 2025-06-04 RX ORDER — HYDROCORTISONE SODIUM SUCCINATE 100 MG/2ML
100 INJECTION INTRAMUSCULAR; INTRAVENOUS
OUTPATIENT
Start: 2025-07-03

## 2025-06-04 RX ORDER — ONDANSETRON 4 MG/1
8 TABLET, ORALLY DISINTEGRATING ORAL ONCE
OUTPATIENT
Start: 2025-07-04 | End: 2025-07-04

## 2025-06-04 RX ORDER — SODIUM CHLORIDE 9 MG/ML
5-250 INJECTION, SOLUTION INTRAVENOUS PRN
OUTPATIENT
Start: 2025-06-30

## 2025-06-04 RX ORDER — SODIUM CHLORIDE 9 MG/ML
5-250 INJECTION, SOLUTION INTRAVENOUS PRN
OUTPATIENT
Start: 2025-07-04

## 2025-06-04 RX ORDER — ALBUTEROL SULFATE 90 UG/1
4 INHALANT RESPIRATORY (INHALATION) PRN
OUTPATIENT
Start: 2025-07-07

## 2025-06-04 RX ORDER — HYDROCORTISONE SODIUM SUCCINATE 100 MG/2ML
100 INJECTION INTRAMUSCULAR; INTRAVENOUS
OUTPATIENT
Start: 2025-07-04

## 2025-06-04 RX ORDER — MEPERIDINE HYDROCHLORIDE 50 MG/ML
12.5 INJECTION INTRAMUSCULAR; INTRAVENOUS; SUBCUTANEOUS PRN
OUTPATIENT
Start: 2025-07-04

## 2025-06-04 RX ORDER — ONDANSETRON 4 MG/1
8 TABLET, ORALLY DISINTEGRATING ORAL ONCE
OUTPATIENT
Start: 2025-07-03 | End: 2025-07-03

## 2025-06-04 RX ORDER — ACETAMINOPHEN 325 MG/1
650 TABLET ORAL
OUTPATIENT
Start: 2025-07-03

## 2025-06-04 RX ORDER — ACETAMINOPHEN 325 MG/1
650 TABLET ORAL
OUTPATIENT
Start: 2025-07-07

## 2025-06-04 RX ORDER — SODIUM CHLORIDE 0.9 % (FLUSH) 0.9 %
5-40 SYRINGE (ML) INJECTION PRN
OUTPATIENT
Start: 2025-07-03

## 2025-06-04 RX ORDER — ALBUTEROL SULFATE 90 UG/1
4 INHALANT RESPIRATORY (INHALATION) PRN
OUTPATIENT
Start: 2025-07-02

## 2025-06-04 RX ORDER — ALBUTEROL SULFATE 90 UG/1
4 INHALANT RESPIRATORY (INHALATION) PRN
OUTPATIENT
Start: 2025-07-01

## 2025-06-04 RX ORDER — ONDANSETRON 4 MG/1
8 TABLET, ORALLY DISINTEGRATING ORAL ONCE
OUTPATIENT
Start: 2025-07-02 | End: 2025-07-02

## 2025-06-04 RX ORDER — ALBUTEROL SULFATE 90 UG/1
4 INHALANT RESPIRATORY (INHALATION) PRN
OUTPATIENT
Start: 2025-07-04

## 2025-06-04 RX ORDER — DIPHENHYDRAMINE HYDROCHLORIDE 50 MG/ML
50 INJECTION, SOLUTION INTRAMUSCULAR; INTRAVENOUS
OUTPATIENT
Start: 2025-07-02

## 2025-06-04 RX ORDER — SODIUM CHLORIDE 9 MG/ML
5-250 INJECTION, SOLUTION INTRAVENOUS PRN
OUTPATIENT
Start: 2025-07-02

## 2025-06-04 RX ORDER — ACETAMINOPHEN 325 MG/1
650 TABLET ORAL
OUTPATIENT
Start: 2025-07-02

## 2025-06-04 RX ORDER — ONDANSETRON 2 MG/ML
8 INJECTION INTRAMUSCULAR; INTRAVENOUS
OUTPATIENT
Start: 2025-06-30

## 2025-06-04 RX ORDER — EPINEPHRINE 1 MG/ML
0.3 INJECTION, SOLUTION, CONCENTRATE INTRAVENOUS PRN
OUTPATIENT
Start: 2025-07-02

## 2025-06-04 RX ORDER — HEPARIN SODIUM (PORCINE) LOCK FLUSH IV SOLN 100 UNIT/ML 100 UNIT/ML
500 SOLUTION INTRAVENOUS PRN
OUTPATIENT
Start: 2025-06-30

## 2025-06-04 RX ORDER — ONDANSETRON 2 MG/ML
8 INJECTION INTRAMUSCULAR; INTRAVENOUS
OUTPATIENT
Start: 2025-07-03

## 2025-06-04 RX ORDER — ONDANSETRON 4 MG/1
8 TABLET, ORALLY DISINTEGRATING ORAL ONCE
Status: DISCONTINUED | OUTPATIENT
Start: 2025-06-04 | End: 2025-06-05 | Stop reason: HOSPADM

## 2025-06-04 RX ORDER — EPINEPHRINE 1 MG/ML
0.3 INJECTION, SOLUTION, CONCENTRATE INTRAVENOUS PRN
OUTPATIENT
Start: 2025-07-08

## 2025-06-04 RX ORDER — ROSUVASTATIN CALCIUM 20 MG/1
20 TABLET, COATED ORAL DAILY
Qty: 90 TABLET | Refills: 1 | Status: SHIPPED | OUTPATIENT
Start: 2025-06-04

## 2025-06-04 RX ORDER — SODIUM CHLORIDE 9 MG/ML
5-250 INJECTION, SOLUTION INTRAVENOUS PRN
OUTPATIENT
Start: 2025-07-03

## 2025-06-04 RX ORDER — MEPERIDINE HYDROCHLORIDE 50 MG/ML
12.5 INJECTION INTRAMUSCULAR; INTRAVENOUS; SUBCUTANEOUS PRN
OUTPATIENT
Start: 2025-06-30

## 2025-06-04 RX ORDER — SODIUM CHLORIDE 0.9 % (FLUSH) 0.9 %
5-40 SYRINGE (ML) INJECTION PRN
OUTPATIENT
Start: 2025-07-02

## 2025-06-04 RX ORDER — ONDANSETRON 4 MG/1
8 TABLET, ORALLY DISINTEGRATING ORAL ONCE
OUTPATIENT
Start: 2025-07-01 | End: 2025-07-01

## 2025-06-04 RX ORDER — SODIUM CHLORIDE 9 MG/ML
5-250 INJECTION, SOLUTION INTRAVENOUS PRN
OUTPATIENT
Start: 2025-07-07

## 2025-06-04 RX ORDER — HYDROCORTISONE SODIUM SUCCINATE 100 MG/2ML
100 INJECTION INTRAMUSCULAR; INTRAVENOUS
OUTPATIENT
Start: 2025-07-08

## 2025-06-04 RX ORDER — SODIUM CHLORIDE 0.9 % (FLUSH) 0.9 %
5-40 SYRINGE (ML) INJECTION PRN
OUTPATIENT
Start: 2025-07-01

## 2025-06-04 RX ORDER — SODIUM CHLORIDE 9 MG/ML
INJECTION, SOLUTION INTRAVENOUS CONTINUOUS
OUTPATIENT
Start: 2025-07-01

## 2025-06-04 RX ORDER — ONDANSETRON 4 MG/1
8 TABLET, ORALLY DISINTEGRATING ORAL ONCE
OUTPATIENT
Start: 2025-07-07 | End: 2025-07-07

## 2025-06-04 RX ORDER — HYDROCORTISONE SODIUM SUCCINATE 100 MG/2ML
100 INJECTION INTRAMUSCULAR; INTRAVENOUS
OUTPATIENT
Start: 2025-07-02

## 2025-06-04 RX ORDER — HEPARIN SODIUM (PORCINE) LOCK FLUSH IV SOLN 100 UNIT/ML 100 UNIT/ML
500 SOLUTION INTRAVENOUS PRN
OUTPATIENT
Start: 2025-07-07

## 2025-06-04 RX ORDER — DIPHENHYDRAMINE HYDROCHLORIDE 50 MG/ML
50 INJECTION, SOLUTION INTRAMUSCULAR; INTRAVENOUS
OUTPATIENT
Start: 2025-07-01

## 2025-06-04 RX ORDER — MEPERIDINE HYDROCHLORIDE 50 MG/ML
12.5 INJECTION INTRAMUSCULAR; INTRAVENOUS; SUBCUTANEOUS PRN
OUTPATIENT
Start: 2025-07-03

## 2025-06-04 RX ORDER — SODIUM CHLORIDE 9 MG/ML
INJECTION, SOLUTION INTRAVENOUS CONTINUOUS
OUTPATIENT
Start: 2025-07-02

## 2025-06-04 RX ORDER — SODIUM CHLORIDE 9 MG/ML
INJECTION, SOLUTION INTRAVENOUS CONTINUOUS
OUTPATIENT
Start: 2025-07-04

## 2025-06-04 RX ORDER — DIPHENHYDRAMINE HYDROCHLORIDE 50 MG/ML
50 INJECTION, SOLUTION INTRAMUSCULAR; INTRAVENOUS
OUTPATIENT
Start: 2025-07-07

## 2025-06-04 RX ORDER — HEPARIN SODIUM (PORCINE) LOCK FLUSH IV SOLN 100 UNIT/ML 100 UNIT/ML
500 SOLUTION INTRAVENOUS PRN
OUTPATIENT
Start: 2025-07-02

## 2025-06-04 RX ORDER — ACETAMINOPHEN 325 MG/1
650 TABLET ORAL
OUTPATIENT
Start: 2025-06-30

## 2025-06-04 RX ORDER — ONDANSETRON 2 MG/ML
8 INJECTION INTRAMUSCULAR; INTRAVENOUS
OUTPATIENT
Start: 2025-07-01

## 2025-06-04 RX ORDER — FAMOTIDINE 10 MG/ML
20 INJECTION, SOLUTION INTRAVENOUS
OUTPATIENT
Start: 2025-07-02

## 2025-06-04 RX ORDER — FAMOTIDINE 10 MG/ML
20 INJECTION, SOLUTION INTRAVENOUS
OUTPATIENT
Start: 2025-07-03

## 2025-06-04 RX ORDER — EPINEPHRINE 1 MG/ML
0.3 INJECTION, SOLUTION, CONCENTRATE INTRAVENOUS PRN
OUTPATIENT
Start: 2025-07-01

## 2025-06-04 RX ORDER — ONDANSETRON 4 MG/1
8 TABLET, ORALLY DISINTEGRATING ORAL ONCE
OUTPATIENT
Start: 2025-06-30 | End: 2025-06-30

## 2025-06-04 RX ORDER — ONDANSETRON 2 MG/ML
8 INJECTION INTRAMUSCULAR; INTRAVENOUS
OUTPATIENT
Start: 2025-07-02

## 2025-06-04 RX ORDER — ONDANSETRON 4 MG/1
8 TABLET, ORALLY DISINTEGRATING ORAL ONCE
OUTPATIENT
Start: 2025-07-08 | End: 2025-07-08

## 2025-06-04 RX ORDER — HYDROCORTISONE SODIUM SUCCINATE 100 MG/2ML
100 INJECTION INTRAMUSCULAR; INTRAVENOUS
OUTPATIENT
Start: 2025-06-30

## 2025-06-04 RX ORDER — MEPERIDINE HYDROCHLORIDE 50 MG/ML
12.5 INJECTION INTRAMUSCULAR; INTRAVENOUS; SUBCUTANEOUS PRN
OUTPATIENT
Start: 2025-07-08

## 2025-06-04 RX ORDER — HEPARIN SODIUM (PORCINE) LOCK FLUSH IV SOLN 100 UNIT/ML 100 UNIT/ML
500 SOLUTION INTRAVENOUS PRN
OUTPATIENT
Start: 2025-07-08

## 2025-06-04 RX ORDER — FAMOTIDINE 10 MG/ML
20 INJECTION, SOLUTION INTRAVENOUS
OUTPATIENT
Start: 2025-07-01

## 2025-06-04 RX ORDER — ALBUTEROL SULFATE 90 UG/1
4 INHALANT RESPIRATORY (INHALATION) PRN
OUTPATIENT
Start: 2025-06-30

## 2025-06-04 RX ORDER — SODIUM CHLORIDE 9 MG/ML
INJECTION, SOLUTION INTRAVENOUS CONTINUOUS
OUTPATIENT
Start: 2025-07-07

## 2025-06-04 RX ORDER — ACETAMINOPHEN 325 MG/1
650 TABLET ORAL
OUTPATIENT
Start: 2025-07-08

## 2025-06-04 RX ORDER — HYDROCORTISONE SODIUM SUCCINATE 100 MG/2ML
100 INJECTION INTRAMUSCULAR; INTRAVENOUS
OUTPATIENT
Start: 2025-07-01

## 2025-06-04 RX ORDER — DIPHENHYDRAMINE HYDROCHLORIDE 50 MG/ML
50 INJECTION, SOLUTION INTRAMUSCULAR; INTRAVENOUS
OUTPATIENT
Start: 2025-07-03

## 2025-06-04 RX ORDER — FAMOTIDINE 10 MG/ML
20 INJECTION, SOLUTION INTRAVENOUS
OUTPATIENT
Start: 2025-07-08

## 2025-06-04 RX ORDER — SODIUM CHLORIDE 0.9 % (FLUSH) 0.9 %
5-40 SYRINGE (ML) INJECTION PRN
OUTPATIENT
Start: 2025-06-30

## 2025-06-04 RX ORDER — MEPERIDINE HYDROCHLORIDE 50 MG/ML
12.5 INJECTION INTRAMUSCULAR; INTRAVENOUS; SUBCUTANEOUS PRN
OUTPATIENT
Start: 2025-07-01

## 2025-06-04 RX ORDER — SODIUM CHLORIDE 9 MG/ML
5-250 INJECTION, SOLUTION INTRAVENOUS PRN
OUTPATIENT
Start: 2025-07-08

## 2025-06-04 RX ORDER — SODIUM CHLORIDE 9 MG/ML
5-250 INJECTION, SOLUTION INTRAVENOUS PRN
OUTPATIENT
Start: 2025-07-01

## 2025-06-04 RX ORDER — HYDROCORTISONE SODIUM SUCCINATE 100 MG/2ML
100 INJECTION INTRAMUSCULAR; INTRAVENOUS
OUTPATIENT
Start: 2025-07-07

## 2025-06-04 RX ORDER — DIPHENHYDRAMINE HYDROCHLORIDE 50 MG/ML
50 INJECTION, SOLUTION INTRAMUSCULAR; INTRAVENOUS
OUTPATIENT
Start: 2025-07-04

## 2025-06-04 RX ORDER — FAMOTIDINE 10 MG/ML
20 INJECTION, SOLUTION INTRAVENOUS
OUTPATIENT
Start: 2025-07-04

## 2025-06-04 RX ORDER — ACETAMINOPHEN 325 MG/1
650 TABLET ORAL
OUTPATIENT
Start: 2025-07-01

## 2025-06-04 RX ORDER — ALBUTEROL SULFATE 90 UG/1
4 INHALANT RESPIRATORY (INHALATION) PRN
OUTPATIENT
Start: 2025-07-03

## 2025-06-04 RX ORDER — ONDANSETRON 2 MG/ML
8 INJECTION INTRAMUSCULAR; INTRAVENOUS
OUTPATIENT
Start: 2025-07-04

## 2025-06-04 RX ORDER — EPINEPHRINE 1 MG/ML
0.3 INJECTION, SOLUTION, CONCENTRATE INTRAVENOUS PRN
OUTPATIENT
Start: 2025-07-03

## 2025-06-04 RX ORDER — HEPARIN SODIUM (PORCINE) LOCK FLUSH IV SOLN 100 UNIT/ML 100 UNIT/ML
500 SOLUTION INTRAVENOUS PRN
OUTPATIENT
Start: 2025-07-01

## 2025-06-04 RX ORDER — FAMOTIDINE 10 MG/ML
20 INJECTION, SOLUTION INTRAVENOUS
OUTPATIENT
Start: 2025-06-30

## 2025-06-04 RX ORDER — SODIUM CHLORIDE 9 MG/ML
INJECTION, SOLUTION INTRAVENOUS CONTINUOUS
OUTPATIENT
Start: 2025-07-03

## 2025-06-04 RX ORDER — DIPHENHYDRAMINE HYDROCHLORIDE 50 MG/ML
50 INJECTION, SOLUTION INTRAMUSCULAR; INTRAVENOUS
OUTPATIENT
Start: 2025-07-08

## 2025-06-04 RX ORDER — DIPHENHYDRAMINE HYDROCHLORIDE 50 MG/ML
50 INJECTION, SOLUTION INTRAMUSCULAR; INTRAVENOUS
OUTPATIENT
Start: 2025-06-30

## 2025-06-04 RX ADMIN — WATER 185 MG: 1 INJECTION INTRAMUSCULAR; INTRAVENOUS; SUBCUTANEOUS at 13:49

## 2025-06-04 NOTE — TELEPHONE ENCOUNTER
Patient's disability paperwork signed by physician. Paperwork provided to patient. Patient states he will apply for SSDI soon and states he has some appointments with [facility] coming up.  encouraged patient to reach out if needed.

## 2025-06-04 NOTE — PROGRESS NOTES
Fabrice Cruz Sr.                                                                                                                  6/4/2025  MRN:   [id number]  YOB: 1965  PCP:                           Chuy Clemente MD  Referring Physician: No ref. provider found  Treating Physician Name: JOYCE SHELBY MD      Reason for visit:  Toxicity check  Discussed treatment plan.    Current problems:  MDS-EB 1 low risk for IPSS-R-2/2024  Peripheral blood NGS: SRSF2 and TET2 positive  Macrocytic anemia  Elevated B12    Active and recent treatments:  Vidaza  Evaluation by the BMT team    Interval history:  History of Present Illness  The patient presents for evaluation of toxicity check discussed treatment plan.  Patient currently started on cycle 1 of Vidaza.  Currently on day #3.    He is currently undergoing his third cycle of treatment, with the next session scheduled for 06/09/2025 and 06/10/2025. He reports experiencing fatigue as a side effect of the treatment. He has been proactive in managing potential nausea by taking antiemetic medication prior to each treatment session. He expresses concern about the possibility of his condition leading to disability, which may prevent him from continuing his work. He has sought advice from a  regarding this issue.  During this visit patient's allergy, social, medical, surgical history and medications were reviewed and updated.    Summary of Case/History:  Fabrice Cruz Sr. a 60 y.o.male is a patient with longstanding history of leukopenia presents to the clinic to establish care and for further workup and evaluation.  Patient has longstanding history of [place] which has been worked in the past patient also has been to [facility] however workup did not reveal any particular etiology of the leukopenia.  Patient denies any recurrent infections.  More recently patient also has developed macrocytic anemia patient's

## 2025-06-04 NOTE — PROGRESS NOTES
Pt here for C1D3 Vidanette. Pt seen by Dr Haines prior to tx, refer to his note. Pt was treated without incident and d/c'd in stable condition. Pt will return tomorrow 6-5-25 for C1D4.

## 2025-06-04 NOTE — TELEPHONE ENCOUNTER
Instructions   from Dr. Jaun Haines MD    Rv with next cycle      Pt informed to keep appts as scheduled

## 2025-06-05 ENCOUNTER — HOSPITAL ENCOUNTER (OUTPATIENT)
Dept: INFUSION THERAPY | Age: 60
Discharge: HOME | End: 2025-06-05
Payer: COMMERCIAL

## 2025-06-05 VITALS
TEMPERATURE: 97.7 F | HEART RATE: 54 BPM | SYSTOLIC BLOOD PRESSURE: 134 MMHG | RESPIRATION RATE: 16 BRPM | DIASTOLIC BLOOD PRESSURE: 77 MMHG

## 2025-06-05 DIAGNOSIS — D46.9 MDS (MYELODYSPLASTIC SYNDROME) (HCC): Primary | ICD-10-CM

## 2025-06-05 PROCEDURE — 96401 CHEMO ANTI-NEOPL SQ/IM: CPT

## 2025-06-05 PROCEDURE — 2500000003 HC RX 250 WO HCPCS: Performed by: INTERNAL MEDICINE

## 2025-06-05 PROCEDURE — 6360000002 HC RX W HCPCS: Performed by: INTERNAL MEDICINE

## 2025-06-05 RX ORDER — ONDANSETRON 4 MG/1
8 TABLET, ORALLY DISINTEGRATING ORAL ONCE
Status: DISCONTINUED | OUTPATIENT
Start: 2025-06-05 | End: 2025-06-06 | Stop reason: HOSPADM

## 2025-06-05 RX ADMIN — WATER 185 MG: 1 INJECTION INTRAMUSCULAR; INTRAVENOUS; SUBCUTANEOUS at 13:49

## 2025-06-05 ASSESSMENT — PAIN SCALES - GENERAL: PAINLEVEL_OUTOF10: 3

## 2025-06-05 ASSESSMENT — PAIN DESCRIPTION - LOCATION: LOCATION: ARM

## 2025-06-05 NOTE — PROGRESS NOTES
Patient arrived for C1D4 da.  Arrives ambulatory.  Denies any complaints.  Injections completed without incident.  Discharged in stable condition.  Returns tomorrow for C1D5.

## 2025-06-06 ENCOUNTER — HOSPITAL ENCOUNTER (OUTPATIENT)
Dept: INFUSION THERAPY | Age: 60
Discharge: HOME | End: 2025-06-06
Payer: COMMERCIAL

## 2025-06-06 VITALS
HEART RATE: 69 BPM | RESPIRATION RATE: 16 BRPM | TEMPERATURE: 97.7 F | SYSTOLIC BLOOD PRESSURE: 145 MMHG | DIASTOLIC BLOOD PRESSURE: 80 MMHG

## 2025-06-06 DIAGNOSIS — D46.9 MDS (MYELODYSPLASTIC SYNDROME) (HCC): Primary | ICD-10-CM

## 2025-06-06 PROCEDURE — 2500000003 HC RX 250 WO HCPCS: Performed by: INTERNAL MEDICINE

## 2025-06-06 PROCEDURE — 6360000002 HC RX W HCPCS: Performed by: INTERNAL MEDICINE

## 2025-06-06 PROCEDURE — 96401 CHEMO ANTI-NEOPL SQ/IM: CPT

## 2025-06-06 RX ADMIN — WATER 185 MG: 1 INJECTION INTRAMUSCULAR; INTRAVENOUS; SUBCUTANEOUS at 12:55

## 2025-06-06 NOTE — PROGRESS NOTES
Patient here for C1D5 Vidaza.  Arrives ambulatory.  Denies any new complaints.  Treatment complete without incident.  Patient discharged in stable condition.  Returns 6/9/25 for C1D8.

## 2025-06-09 ENCOUNTER — HOSPITAL ENCOUNTER (OUTPATIENT)
Dept: INFUSION THERAPY | Age: 60
Discharge: HOME | End: 2025-06-09
Payer: COMMERCIAL

## 2025-06-09 DIAGNOSIS — D46.9 MDS (MYELODYSPLASTIC SYNDROME) (HCC): Primary | ICD-10-CM

## 2025-06-09 PROCEDURE — 2500000003 HC RX 250 WO HCPCS: Performed by: INTERNAL MEDICINE

## 2025-06-09 PROCEDURE — 6360000002 HC RX W HCPCS: Performed by: INTERNAL MEDICINE

## 2025-06-09 PROCEDURE — 96401 CHEMO ANTI-NEOPL SQ/IM: CPT

## 2025-06-09 RX ADMIN — AZACITIDINE 185 MG: 100 INJECTION, POWDER, LYOPHILIZED, FOR SOLUTION INTRAVENOUS; SUBCUTANEOUS at 14:14

## 2025-06-09 NOTE — PROGRESS NOTES
Pt here for C.1D.6 Vidaza.  Arrives ambulatory.  Denies any new complaints.  Tx complete without incident.  Pt d/c'd in stable condition.  Returns 6/10 for C1D7 Vidaza.

## 2025-06-10 ENCOUNTER — HOSPITAL ENCOUNTER (OUTPATIENT)
Dept: INFUSION THERAPY | Age: 60
Discharge: HOME | End: 2025-06-10
Payer: COMMERCIAL

## 2025-06-10 VITALS — HEART RATE: 64 BPM | RESPIRATION RATE: 18 BRPM | SYSTOLIC BLOOD PRESSURE: 129 MMHG | DIASTOLIC BLOOD PRESSURE: 77 MMHG

## 2025-06-10 DIAGNOSIS — D46.9 MDS (MYELODYSPLASTIC SYNDROME) (HCC): Primary | ICD-10-CM

## 2025-06-10 PROCEDURE — 6360000002 HC RX W HCPCS: Performed by: INTERNAL MEDICINE

## 2025-06-10 PROCEDURE — 6370000000 HC RX 637 (ALT 250 FOR IP): Performed by: INTERNAL MEDICINE

## 2025-06-10 PROCEDURE — 2500000003 HC RX 250 WO HCPCS: Performed by: INTERNAL MEDICINE

## 2025-06-10 PROCEDURE — 96401 CHEMO ANTI-NEOPL SQ/IM: CPT

## 2025-06-10 RX ORDER — ONDANSETRON 4 MG/1
8 TABLET, ORALLY DISINTEGRATING ORAL ONCE
Status: COMPLETED | OUTPATIENT
Start: 2025-06-10 | End: 2025-06-10

## 2025-06-10 RX ADMIN — ONDANSETRON 8 MG: 4 TABLET, ORALLY DISINTEGRATING ORAL at 14:22

## 2025-06-10 RX ADMIN — WATER 185 MG: 1 INJECTION INTRAMUSCULAR; INTRAVENOUS; SUBCUTANEOUS at 14:18

## 2025-06-10 NOTE — PROGRESS NOTES
Pt here for vidaza injection.   Denies any complaints  Injection given without incident.  Pt discharged in stable condition.   Returns 6/30/25 for C2D1.

## 2025-06-20 ENCOUNTER — OFFICE VISIT (OUTPATIENT)
Dept: PAIN MANAGEMENT | Age: 60
End: 2025-06-20
Payer: COMMERCIAL

## 2025-06-20 VITALS — BODY MASS INDEX: 31.36 KG/M2 | HEIGHT: 75 IN | WEIGHT: 252.21 LBS

## 2025-06-20 DIAGNOSIS — M54.17 LUMBOSACRAL NEURITIS: ICD-10-CM

## 2025-06-20 DIAGNOSIS — M47.817 LUMBOSACRAL SPONDYLOSIS WITHOUT MYELOPATHY: ICD-10-CM

## 2025-06-20 PROCEDURE — 99214 OFFICE O/P EST MOD 30 MIN: CPT | Performed by: PAIN MEDICINE

## 2025-06-20 RX ORDER — GABAPENTIN 300 MG/1
300 CAPSULE ORAL 3 TIMES DAILY
Qty: 90 CAPSULE | Refills: 2 | Status: SHIPPED | OUTPATIENT
Start: 2025-06-20 | End: 2025-07-20

## 2025-06-20 ASSESSMENT — ENCOUNTER SYMPTOMS: BACK PAIN: 1

## 2025-06-20 NOTE — PROGRESS NOTES
the chief complaint and history of present illness (including ROS and PFSH) and vital documentation by my staff and I agree with their documentation and have added where applicable.

## 2025-06-24 DIAGNOSIS — M54.17 LUMBOSACRAL NEURITIS: ICD-10-CM

## 2025-06-24 DIAGNOSIS — M47.817 LUMBOSACRAL SPONDYLOSIS WITHOUT MYELOPATHY: ICD-10-CM

## 2025-06-24 RX ORDER — GABAPENTIN 300 MG/1
300 CAPSULE ORAL 3 TIMES DAILY
Qty: 90 CAPSULE | Refills: 2 | OUTPATIENT
Start: 2025-06-24

## 2025-06-30 ENCOUNTER — HOSPITAL ENCOUNTER (OUTPATIENT)
Dept: INFUSION THERAPY | Age: 60
Discharge: HOME | End: 2025-06-30
Payer: COMMERCIAL

## 2025-06-30 VITALS
WEIGHT: 244.2 LBS | TEMPERATURE: 98.1 F | SYSTOLIC BLOOD PRESSURE: 107 MMHG | DIASTOLIC BLOOD PRESSURE: 64 MMHG | HEART RATE: 58 BPM | BODY MASS INDEX: 30.52 KG/M2 | RESPIRATION RATE: 16 BRPM

## 2025-06-30 DIAGNOSIS — D46.9 MDS (MYELODYSPLASTIC SYNDROME) (HCC): Primary | ICD-10-CM

## 2025-06-30 LAB
ALBUMIN SERPL-MCNC: 4.1 G/DL (ref 3.5–5.2)
ALBUMIN/GLOB SERPL: 1.4 {RATIO} (ref 1–2.5)
ALP SERPL-CCNC: 49 U/L (ref 40–129)
ALT SERPL-CCNC: 23 U/L (ref 10–50)
ANION GAP SERPL CALCULATED.3IONS-SCNC: 8 MMOL/L (ref 9–16)
AST SERPL-CCNC: 26 U/L (ref 10–50)
BASOPHILS # BLD: 0.13 K/UL (ref 0–0.2)
BASOPHILS NFR BLD: 6 % (ref 0–2)
BILIRUB SERPL-MCNC: 0.3 MG/DL (ref 0–1.2)
BUN SERPL-MCNC: 11 MG/DL (ref 8–23)
CALCIUM SERPL-MCNC: 9.1 MG/DL (ref 8.6–10.4)
CHLORIDE SERPL-SCNC: 105 MMOL/L (ref 98–107)
CO2 SERPL-SCNC: 27 MMOL/L (ref 20–31)
CREAT SERPL-MCNC: 0.9 MG/DL (ref 0.7–1.2)
EOSINOPHIL # BLD: 0.02 K/UL (ref 0–0.4)
EOSINOPHILS RELATIVE PERCENT: 1 % (ref 1–4)
ERYTHROCYTE [DISTWIDTH] IN BLOOD BY AUTOMATED COUNT: 13.4 % (ref 12.5–15.4)
GFR, ESTIMATED: >90 ML/MIN/1.73M2
GLUCOSE SERPL-MCNC: 78 MG/DL (ref 74–99)
HCT VFR BLD AUTO: 33.2 % (ref 41–53)
HGB BLD-MCNC: 10.4 G/DL (ref 13.5–17.5)
LYMPHOCYTES NFR BLD: 0.96 K/UL (ref 1–4.8)
LYMPHOCYTES RELATIVE PERCENT: 46 % (ref 24–44)
MCH RBC QN AUTO: 31.9 PG (ref 26–34)
MCHC RBC AUTO-ENTMCNC: 31.3 G/DL (ref 31–37)
MCV RBC AUTO: 101.8 FL (ref 80–100)
MONOCYTES NFR BLD: 0.04 K/UL (ref 0.1–1.2)
MONOCYTES NFR BLD: 2 % (ref 2–11)
MORPHOLOGY: NORMAL
NEUTROPHILS NFR BLD: 45 % (ref 36–66)
NEUTS SEG NFR BLD: 0.95 K/UL (ref 1.8–7.7)
PLATELET # BLD AUTO: 156 K/UL (ref 140–450)
PMV BLD AUTO: 9.4 FL (ref 8–14)
POTASSIUM SERPL-SCNC: 4.4 MMOL/L (ref 3.7–5.3)
PROT SERPL-MCNC: 7.1 G/DL (ref 6.6–8.7)
RBC # BLD AUTO: 3.26 M/UL (ref 4.5–5.9)
SODIUM SERPL-SCNC: 140 MMOL/L (ref 136–145)
WBC OTHER # BLD: 2.1 K/UL (ref 3.5–11)

## 2025-06-30 PROCEDURE — 80053 COMPREHEN METABOLIC PANEL: CPT

## 2025-06-30 PROCEDURE — 2500000003 HC RX 250 WO HCPCS: Performed by: INTERNAL MEDICINE

## 2025-06-30 PROCEDURE — 96401 CHEMO ANTI-NEOPL SQ/IM: CPT

## 2025-06-30 PROCEDURE — 36415 COLL VENOUS BLD VENIPUNCTURE: CPT

## 2025-06-30 PROCEDURE — 85025 COMPLETE CBC W/AUTO DIFF WBC: CPT

## 2025-06-30 PROCEDURE — 6360000002 HC RX W HCPCS: Performed by: INTERNAL MEDICINE

## 2025-06-30 RX ADMIN — WATER 185 MG: 1 INJECTION INTRAMUSCULAR; INTRAVENOUS; SUBCUTANEOUS at 14:28

## 2025-06-30 NOTE — PROGRESS NOTES
Pt here for C2D1 vidaza.  Arrives ambulatory.  Denies any new complaints.  Labs drawn, results reviewed.  Tx complete without incident.  Pt d/c'd in stable condition.  Returns 7/1/25 for C2D2 vidaza.

## 2025-07-01 ENCOUNTER — HOSPITAL ENCOUNTER (OUTPATIENT)
Dept: INFUSION THERAPY | Age: 60
Discharge: HOME | End: 2025-07-01
Payer: COMMERCIAL

## 2025-07-01 VITALS
HEART RATE: 56 BPM | DIASTOLIC BLOOD PRESSURE: 71 MMHG | TEMPERATURE: 97.7 F | SYSTOLIC BLOOD PRESSURE: 120 MMHG | RESPIRATION RATE: 16 BRPM

## 2025-07-01 DIAGNOSIS — D46.9 MDS (MYELODYSPLASTIC SYNDROME) (HCC): Primary | ICD-10-CM

## 2025-07-01 PROCEDURE — 2500000003 HC RX 250 WO HCPCS: Performed by: INTERNAL MEDICINE

## 2025-07-01 PROCEDURE — 6360000002 HC RX W HCPCS: Performed by: INTERNAL MEDICINE

## 2025-07-01 PROCEDURE — 96401 CHEMO ANTI-NEOPL SQ/IM: CPT

## 2025-07-01 RX ADMIN — WATER 185 MG: 1 INJECTION INTRAMUSCULAR; INTRAVENOUS; SUBCUTANEOUS at 13:59

## 2025-07-01 NOTE — PROGRESS NOTES
Patient here for C2D2 vidaza.  Arrives ambulatory.  Denies any new complaints.  Injection complete without incident.  Patient discharged in stable condition.  Returns 7/2/25 for MD visit and C2D3.

## 2025-07-02 ENCOUNTER — OFFICE VISIT (OUTPATIENT)
Age: 60
End: 2025-07-02
Payer: COMMERCIAL

## 2025-07-02 ENCOUNTER — HOSPITAL ENCOUNTER (OUTPATIENT)
Dept: INFUSION THERAPY | Age: 60
Discharge: HOME | End: 2025-07-02
Payer: COMMERCIAL

## 2025-07-02 ENCOUNTER — TELEPHONE (OUTPATIENT)
Age: 60
End: 2025-07-02

## 2025-07-02 VITALS
TEMPERATURE: 98.3 F | HEART RATE: 65 BPM | DIASTOLIC BLOOD PRESSURE: 79 MMHG | BODY MASS INDEX: 30.14 KG/M2 | WEIGHT: 241.1 LBS | SYSTOLIC BLOOD PRESSURE: 139 MMHG | RESPIRATION RATE: 16 BRPM

## 2025-07-02 DIAGNOSIS — H11.439: ICD-10-CM

## 2025-07-02 DIAGNOSIS — D70.9 NEUTROPENIA, UNSPECIFIED TYPE: ICD-10-CM

## 2025-07-02 DIAGNOSIS — Z51.11 CHEMOTHERAPY MANAGEMENT, ENCOUNTER FOR: ICD-10-CM

## 2025-07-02 DIAGNOSIS — D46.9 MDS (MYELODYSPLASTIC SYNDROME) (HCC): Primary | ICD-10-CM

## 2025-07-02 PROCEDURE — 99215 OFFICE O/P EST HI 40 MIN: CPT | Performed by: INTERNAL MEDICINE

## 2025-07-02 PROCEDURE — 6360000002 HC RX W HCPCS: Performed by: INTERNAL MEDICINE

## 2025-07-02 PROCEDURE — 2500000003 HC RX 250 WO HCPCS: Performed by: INTERNAL MEDICINE

## 2025-07-02 PROCEDURE — 96401 CHEMO ANTI-NEOPL SQ/IM: CPT

## 2025-07-02 PROCEDURE — 6370000000 HC RX 637 (ALT 250 FOR IP): Performed by: INTERNAL MEDICINE

## 2025-07-02 RX ORDER — HEPARIN 100 UNIT/ML
500 SYRINGE INTRAVENOUS PRN
Status: DISCONTINUED | OUTPATIENT
Start: 2025-07-02 | End: 2025-07-03 | Stop reason: HOSPADM

## 2025-07-02 RX ORDER — SODIUM CHLORIDE 0.9 % (FLUSH) 0.9 %
5-40 SYRINGE (ML) INJECTION PRN
Status: DISCONTINUED | OUTPATIENT
Start: 2025-07-02 | End: 2025-07-03 | Stop reason: HOSPADM

## 2025-07-02 RX ORDER — ONDANSETRON 4 MG/1
8 TABLET, ORALLY DISINTEGRATING ORAL ONCE
Status: COMPLETED | OUTPATIENT
Start: 2025-07-02 | End: 2025-07-02

## 2025-07-02 RX ORDER — SODIUM CHLORIDE 9 MG/ML
5-250 INJECTION, SOLUTION INTRAVENOUS PRN
Status: DISCONTINUED | OUTPATIENT
Start: 2025-07-02 | End: 2025-07-03 | Stop reason: HOSPADM

## 2025-07-02 RX ADMIN — WATER 185 MG: 1 INJECTION INTRAMUSCULAR; INTRAVENOUS; SUBCUTANEOUS at 12:09

## 2025-07-02 RX ADMIN — ONDANSETRON 8 MG: 4 TABLET, ORALLY DISINTEGRATING ORAL at 12:07

## 2025-07-02 NOTE — PROGRESS NOTES
Pt here for C2D3 - Vidaza injection.  Arrives ambulatory.  Denies any complaints.  Injection complete without incident.  Pt discharged in stable condition.   Scheduled for C2D4 7/3/2025

## 2025-07-02 NOTE — TELEPHONE ENCOUNTER
Instructions   from Dr. Jaun Haines MD    Bone marrow biopsy after 3 weeks   Rv 1 caryl after bone marrow biopsy     Bone bx on 7- . ELISEO's at 9:30  RV 7-

## 2025-07-02 NOTE — PROGRESS NOTES
[facility] however workup did not reveal any particular etiology of the leukopenia.  Patient denies any recurrent infections.  More recently patient also has developed macrocytic anemia patient's hemoglobin was 12.4 with MCV of 93 back in February 2018.  Patient CBC from December 2023 shows hemoglobin of 10.8 with MCV of 109.  Patient denies any heavy alcohol consumption.  Patient has had bone marrow biopsy done in the past we did not reveal any etiology of the leukopenia.  Denies any history of liver disease.  Patient denies any weight loss drenching night sweats excessive fatigue or swollen glands.  CT abdomen pelvis from 2017 did not reveal any acute intra-abdominal process.    Past Medical History:   Past Medical History:   Diagnosis Date    Arthritis     Heartburn     Hyperlipidemia     Leukemia (HCC)     Plantar fasciitis, bilateral        Past Surgical History:     Past Surgical History:   Procedure Laterality Date    COLONOSCOPY      COLONOSCOPY N/A 2/19/2018    COLONOSCOPY WITH BIOPSY performed by Aroldo Cabrera MD at [facility] OR    CT BIOPSY BONE MARROW  2/13/2024    CT BONE MARROW BIOPSY 2/13/2024 [facility] CT SCAN    CT BIOPSY BONE MARROW  4/7/2025    CT BIOPSY BONE MARROW 4/7/2025 [facility] CT SCAN    ELBOW SURGERY Right     HERNIA REPAIR Bilateral 2006    FL COLON CA SCRN NOT HI RSK IND N/A 12/15/2017    COLONOSCOPY performed by Aroldo Cabrera MD at [facility] OR    FL EXCISION TUMOR SOFT TISSUE BACK/FLANK SUBQ <3CM N/A 12/1/2017    BACK LESION EXCISION UPPER x2 performed by Aroldo Cabrera MD at [facility] OR    SKIN GRAFT      finger    TONSILLECTOMY      VASCULAR SURGERY      VEIN SURGERY Bilateral     legs       Patient Family Social History:  Family History   Problem Relation Age of Onset    Cancer Mother         colon cancer     Social History     Socioeconomic History    Marital status:    Tobacco Use    Smoking status: Former     Current packs/day: 0.00     Average packs/day: 1 pack/day for

## 2025-07-03 ENCOUNTER — HOSPITAL ENCOUNTER (OUTPATIENT)
Dept: INFUSION THERAPY | Age: 60
Discharge: HOME | End: 2025-07-03
Payer: COMMERCIAL

## 2025-07-03 DIAGNOSIS — D46.9 MDS (MYELODYSPLASTIC SYNDROME) (HCC): Primary | ICD-10-CM

## 2025-07-03 PROCEDURE — 6360000002 HC RX W HCPCS: Performed by: INTERNAL MEDICINE

## 2025-07-03 PROCEDURE — 2500000003 HC RX 250 WO HCPCS: Performed by: INTERNAL MEDICINE

## 2025-07-03 PROCEDURE — 96401 CHEMO ANTI-NEOPL SQ/IM: CPT

## 2025-07-03 RX ADMIN — WATER 185 MG: 1 INJECTION INTRAMUSCULAR; INTRAVENOUS; SUBCUTANEOUS at 13:56

## 2025-07-03 NOTE — PROGRESS NOTES
Pt here for C2D4 vidaza.  Arrives ambulatory.  Denies any new complaints.  Tx complete without incident.  Pt d/c'd in stable condition.  Returns 7/7/25 for C2D5 vidaza..

## 2025-07-07 ENCOUNTER — HOSPITAL ENCOUNTER (OUTPATIENT)
Dept: INFUSION THERAPY | Age: 60
Discharge: HOME | End: 2025-07-07
Payer: COMMERCIAL

## 2025-07-07 VITALS
WEIGHT: 245 LBS | HEART RATE: 66 BPM | SYSTOLIC BLOOD PRESSURE: 142 MMHG | DIASTOLIC BLOOD PRESSURE: 71 MMHG | TEMPERATURE: 98.2 F | BODY MASS INDEX: 30.62 KG/M2 | RESPIRATION RATE: 16 BRPM | OXYGEN SATURATION: 99 %

## 2025-07-07 DIAGNOSIS — D46.9 MDS (MYELODYSPLASTIC SYNDROME) (HCC): Primary | ICD-10-CM

## 2025-07-07 PROCEDURE — 2500000003 HC RX 250 WO HCPCS: Performed by: INTERNAL MEDICINE

## 2025-07-07 PROCEDURE — 6360000002 HC RX W HCPCS: Performed by: INTERNAL MEDICINE

## 2025-07-07 PROCEDURE — 96401 CHEMO ANTI-NEOPL SQ/IM: CPT

## 2025-07-07 RX ADMIN — WATER 185 MG: 1 INJECTION INTRAMUSCULAR; INTRAVENOUS; SUBCUTANEOUS at 14:04

## 2025-07-07 NOTE — PROGRESS NOTES
Patient here for C2D5 - Vidaza injection.  Arrives ambulatory.  Denies any new complaints.  Treatment completed without incident.  Discharged in stable condition.  Scheduled for next apt 7/8/2025

## 2025-07-07 NOTE — PROGRESS NOTES
Patient here for C2D5 - Vidaza injection.  Arrives ambulatory.  Denies any new complaints.  Injection completed without incident.  Discharged in stable condition.  Scheduled for next apt 7/8/2025

## 2025-07-08 ENCOUNTER — HOSPITAL ENCOUNTER (OUTPATIENT)
Dept: INFUSION THERAPY | Age: 60
Discharge: HOME | End: 2025-07-08
Payer: COMMERCIAL

## 2025-07-08 DIAGNOSIS — D46.9 MDS (MYELODYSPLASTIC SYNDROME) (HCC): Primary | ICD-10-CM

## 2025-07-08 PROCEDURE — 2500000003 HC RX 250 WO HCPCS: Performed by: INTERNAL MEDICINE

## 2025-07-08 PROCEDURE — 6360000002 HC RX W HCPCS: Performed by: INTERNAL MEDICINE

## 2025-07-08 PROCEDURE — 96401 CHEMO ANTI-NEOPL SQ/IM: CPT

## 2025-07-08 RX ADMIN — WATER 185 MG: 1 INJECTION INTRAMUSCULAR; INTRAVENOUS; SUBCUTANEOUS at 13:53

## 2025-07-08 NOTE — PROGRESS NOTES
Pt here for C2D8 vidaza.  Arrives ambulatory.  Denies any new complaints.  Tx complete without incident.  Pt d/c'd in stable condition.  Returns 7/9/25 for C2D9 vidaza.

## 2025-07-09 ENCOUNTER — HOSPITAL ENCOUNTER (OUTPATIENT)
Dept: INFUSION THERAPY | Age: 60
Discharge: HOME | End: 2025-07-09
Payer: COMMERCIAL

## 2025-07-09 ENCOUNTER — TELEPHONE (OUTPATIENT)
Age: 60
End: 2025-07-09

## 2025-07-09 VITALS
HEART RATE: 63 BPM | TEMPERATURE: 97.3 F | SYSTOLIC BLOOD PRESSURE: 145 MMHG | DIASTOLIC BLOOD PRESSURE: 87 MMHG | RESPIRATION RATE: 16 BRPM

## 2025-07-09 DIAGNOSIS — D46.9 MDS (MYELODYSPLASTIC SYNDROME) (HCC): Primary | ICD-10-CM

## 2025-07-09 PROCEDURE — 2500000003 HC RX 250 WO HCPCS: Performed by: INTERNAL MEDICINE

## 2025-07-09 PROCEDURE — 96401 CHEMO ANTI-NEOPL SQ/IM: CPT

## 2025-07-09 PROCEDURE — 6360000002 HC RX W HCPCS: Performed by: INTERNAL MEDICINE

## 2025-07-09 RX ORDER — ONDANSETRON 4 MG/1
8 TABLET, ORALLY DISINTEGRATING ORAL ONCE
Status: DISCONTINUED | OUTPATIENT
Start: 2025-07-09 | End: 2025-07-10 | Stop reason: HOSPADM

## 2025-07-09 RX ADMIN — WATER 185 MG: 1 INJECTION INTRAMUSCULAR; INTRAVENOUS; SUBCUTANEOUS at 14:30

## 2025-07-09 NOTE — PROGRESS NOTES
Patient arrives ambulatory for Vidaza C2D9  Patient denies complaints or concerns  Denies any new complaints  Patient tolerated tx without incident and discharged in stable condition  Next appointment 7/23 for MD visit

## 2025-07-10 ENCOUNTER — TELEPHONE (OUTPATIENT)
Age: 60
End: 2025-07-10

## 2025-07-23 ENCOUNTER — TELEPHONE (OUTPATIENT)
Age: 60
End: 2025-07-23

## 2025-07-23 ENCOUNTER — OFFICE VISIT (OUTPATIENT)
Age: 60
End: 2025-07-23
Payer: COMMERCIAL

## 2025-07-23 VITALS
OXYGEN SATURATION: 96 % | BODY MASS INDEX: 30.25 KG/M2 | WEIGHT: 242 LBS | DIASTOLIC BLOOD PRESSURE: 73 MMHG | SYSTOLIC BLOOD PRESSURE: 114 MMHG | TEMPERATURE: 97.4 F | RESPIRATION RATE: 18 BRPM | HEART RATE: 64 BPM

## 2025-07-23 DIAGNOSIS — D46.9 MDS (MYELODYSPLASTIC SYNDROME) (HCC): Primary | ICD-10-CM

## 2025-07-23 DIAGNOSIS — Z51.11 CHEMOTHERAPY MANAGEMENT, ENCOUNTER FOR: ICD-10-CM

## 2025-07-23 PROCEDURE — 99214 OFFICE O/P EST MOD 30 MIN: CPT | Performed by: INTERNAL MEDICINE

## 2025-07-23 RX ORDER — SODIUM CHLORIDE 9 MG/ML
5-250 INJECTION, SOLUTION INTRAVENOUS PRN
Status: CANCELLED | OUTPATIENT
Start: 2025-07-30

## 2025-07-23 RX ORDER — SODIUM CHLORIDE 9 MG/ML
INJECTION, SOLUTION INTRAVENOUS CONTINUOUS
Status: CANCELLED | OUTPATIENT
Start: 2025-08-02

## 2025-07-23 RX ORDER — FAMOTIDINE 10 MG/ML
20 INJECTION, SOLUTION INTRAVENOUS
OUTPATIENT
Start: 2025-08-06

## 2025-07-23 RX ORDER — DIPHENHYDRAMINE HYDROCHLORIDE 50 MG/ML
50 INJECTION, SOLUTION INTRAMUSCULAR; INTRAVENOUS
OUTPATIENT
Start: 2025-08-06

## 2025-07-23 RX ORDER — ONDANSETRON 2 MG/ML
8 INJECTION INTRAMUSCULAR; INTRAVENOUS
Status: CANCELLED | OUTPATIENT
Start: 2025-07-31

## 2025-07-23 RX ORDER — SODIUM CHLORIDE 9 MG/ML
INJECTION, SOLUTION INTRAVENOUS CONTINUOUS
OUTPATIENT
Start: 2025-08-05

## 2025-07-23 RX ORDER — ONDANSETRON 2 MG/ML
8 INJECTION INTRAMUSCULAR; INTRAVENOUS
Status: CANCELLED | OUTPATIENT
Start: 2025-08-02

## 2025-07-23 RX ORDER — HEPARIN SODIUM (PORCINE) LOCK FLUSH IV SOLN 100 UNIT/ML 100 UNIT/ML
500 SOLUTION INTRAVENOUS PRN
Status: CANCELLED | OUTPATIENT
Start: 2025-08-01

## 2025-07-23 RX ORDER — ONDANSETRON 4 MG/1
8 TABLET, ORALLY DISINTEGRATING ORAL ONCE
OUTPATIENT
Start: 2025-08-05 | End: 2025-08-05

## 2025-07-23 RX ORDER — ONDANSETRON 4 MG/1
8 TABLET, ORALLY DISINTEGRATING ORAL ONCE
Status: CANCELLED | OUTPATIENT
Start: 2025-07-31 | End: 2025-07-31

## 2025-07-23 RX ORDER — SODIUM CHLORIDE 9 MG/ML
5-250 INJECTION, SOLUTION INTRAVENOUS PRN
Status: CANCELLED | OUTPATIENT
Start: 2025-07-31

## 2025-07-23 RX ORDER — SODIUM CHLORIDE 0.9 % (FLUSH) 0.9 %
5-40 SYRINGE (ML) INJECTION PRN
Status: CANCELLED | OUTPATIENT
Start: 2025-07-30

## 2025-07-23 RX ORDER — SODIUM CHLORIDE 9 MG/ML
5-250 INJECTION, SOLUTION INTRAVENOUS PRN
OUTPATIENT
Start: 2025-08-06

## 2025-07-23 RX ORDER — SODIUM CHLORIDE 9 MG/ML
INJECTION, SOLUTION INTRAVENOUS CONTINUOUS
Status: CANCELLED | OUTPATIENT
Start: 2025-08-01

## 2025-07-23 RX ORDER — SODIUM CHLORIDE 9 MG/ML
INJECTION, SOLUTION INTRAVENOUS CONTINUOUS
OUTPATIENT
Start: 2025-08-06

## 2025-07-23 RX ORDER — ALBUTEROL SULFATE 90 UG/1
4 INHALANT RESPIRATORY (INHALATION) PRN
OUTPATIENT
Start: 2025-08-06

## 2025-07-23 RX ORDER — MEPERIDINE HYDROCHLORIDE 50 MG/ML
12.5 INJECTION INTRAMUSCULAR; INTRAVENOUS; SUBCUTANEOUS PRN
OUTPATIENT
Start: 2025-08-06

## 2025-07-23 RX ORDER — HEPARIN SODIUM (PORCINE) LOCK FLUSH IV SOLN 100 UNIT/ML 100 UNIT/ML
500 SOLUTION INTRAVENOUS PRN
OUTPATIENT
Start: 2025-08-05

## 2025-07-23 RX ORDER — DIPHENHYDRAMINE HYDROCHLORIDE 50 MG/ML
50 INJECTION, SOLUTION INTRAMUSCULAR; INTRAVENOUS
Status: CANCELLED | OUTPATIENT
Start: 2025-07-31

## 2025-07-23 RX ORDER — SODIUM CHLORIDE 0.9 % (FLUSH) 0.9 %
5-40 SYRINGE (ML) INJECTION PRN
OUTPATIENT
Start: 2025-08-05

## 2025-07-23 RX ORDER — HEPARIN SODIUM (PORCINE) LOCK FLUSH IV SOLN 100 UNIT/ML 100 UNIT/ML
500 SOLUTION INTRAVENOUS PRN
OUTPATIENT
Start: 2025-08-06

## 2025-07-23 RX ORDER — ALBUTEROL SULFATE 90 UG/1
4 INHALANT RESPIRATORY (INHALATION) PRN
Status: CANCELLED | OUTPATIENT
Start: 2025-07-31

## 2025-07-23 RX ORDER — ACETAMINOPHEN 325 MG/1
650 TABLET ORAL
OUTPATIENT
Start: 2025-08-05

## 2025-07-23 RX ORDER — FAMOTIDINE 10 MG/ML
20 INJECTION, SOLUTION INTRAVENOUS
Status: CANCELLED | OUTPATIENT
Start: 2025-08-02

## 2025-07-23 RX ORDER — SODIUM CHLORIDE 9 MG/ML
5-250 INJECTION, SOLUTION INTRAVENOUS PRN
Status: CANCELLED | OUTPATIENT
Start: 2025-08-01

## 2025-07-23 RX ORDER — HYDROCORTISONE SODIUM SUCCINATE 100 MG/2ML
100 INJECTION INTRAMUSCULAR; INTRAVENOUS
Status: CANCELLED | OUTPATIENT
Start: 2025-08-01

## 2025-07-23 RX ORDER — SODIUM CHLORIDE 0.9 % (FLUSH) 0.9 %
5-40 SYRINGE (ML) INJECTION PRN
OUTPATIENT
Start: 2025-08-06

## 2025-07-23 RX ORDER — ALBUTEROL SULFATE 90 UG/1
4 INHALANT RESPIRATORY (INHALATION) PRN
Status: CANCELLED | OUTPATIENT
Start: 2025-07-30

## 2025-07-23 RX ORDER — ONDANSETRON 4 MG/1
8 TABLET, ORALLY DISINTEGRATING ORAL ONCE
Status: CANCELLED | OUTPATIENT
Start: 2025-07-30 | End: 2025-07-30

## 2025-07-23 RX ORDER — EPINEPHRINE 1 MG/ML
0.3 INJECTION, SOLUTION, CONCENTRATE INTRAVENOUS PRN
Status: CANCELLED | OUTPATIENT
Start: 2025-07-31

## 2025-07-23 RX ORDER — ONDANSETRON 2 MG/ML
8 INJECTION INTRAMUSCULAR; INTRAVENOUS
Status: CANCELLED | OUTPATIENT
Start: 2025-07-29

## 2025-07-23 RX ORDER — HYDROCORTISONE SODIUM SUCCINATE 100 MG/2ML
100 INJECTION INTRAMUSCULAR; INTRAVENOUS
Status: CANCELLED | OUTPATIENT
Start: 2025-08-02

## 2025-07-23 RX ORDER — HEPARIN SODIUM (PORCINE) LOCK FLUSH IV SOLN 100 UNIT/ML 100 UNIT/ML
500 SOLUTION INTRAVENOUS PRN
Status: CANCELLED | OUTPATIENT
Start: 2025-07-29

## 2025-07-23 RX ORDER — MEPERIDINE HYDROCHLORIDE 50 MG/ML
12.5 INJECTION INTRAMUSCULAR; INTRAVENOUS; SUBCUTANEOUS PRN
Status: CANCELLED | OUTPATIENT
Start: 2025-08-01

## 2025-07-23 RX ORDER — SODIUM CHLORIDE 9 MG/ML
5-250 INJECTION, SOLUTION INTRAVENOUS PRN
Status: CANCELLED | OUTPATIENT
Start: 2025-08-02

## 2025-07-23 RX ORDER — ALBUTEROL SULFATE 90 UG/1
4 INHALANT RESPIRATORY (INHALATION) PRN
OUTPATIENT
Start: 2025-08-05

## 2025-07-23 RX ORDER — HYDROCORTISONE SODIUM SUCCINATE 100 MG/2ML
100 INJECTION INTRAMUSCULAR; INTRAVENOUS
OUTPATIENT
Start: 2025-08-06

## 2025-07-23 RX ORDER — HEPARIN SODIUM (PORCINE) LOCK FLUSH IV SOLN 100 UNIT/ML 100 UNIT/ML
500 SOLUTION INTRAVENOUS PRN
Status: CANCELLED | OUTPATIENT
Start: 2025-08-02

## 2025-07-23 RX ORDER — SODIUM CHLORIDE 0.9 % (FLUSH) 0.9 %
5-40 SYRINGE (ML) INJECTION PRN
Status: CANCELLED | OUTPATIENT
Start: 2025-08-01

## 2025-07-23 RX ORDER — SODIUM CHLORIDE 9 MG/ML
INJECTION, SOLUTION INTRAVENOUS CONTINUOUS
Status: CANCELLED | OUTPATIENT
Start: 2025-07-31

## 2025-07-23 RX ORDER — SODIUM CHLORIDE 0.9 % (FLUSH) 0.9 %
5-40 SYRINGE (ML) INJECTION PRN
Status: CANCELLED | OUTPATIENT
Start: 2025-08-02

## 2025-07-23 RX ORDER — EPINEPHRINE 1 MG/ML
0.3 INJECTION, SOLUTION, CONCENTRATE INTRAVENOUS PRN
Status: CANCELLED | OUTPATIENT
Start: 2025-07-30

## 2025-07-23 RX ORDER — EPINEPHRINE 1 MG/ML
0.3 INJECTION, SOLUTION, CONCENTRATE INTRAVENOUS PRN
Status: CANCELLED | OUTPATIENT
Start: 2025-07-29

## 2025-07-23 RX ORDER — DIPHENHYDRAMINE HYDROCHLORIDE 50 MG/ML
50 INJECTION, SOLUTION INTRAMUSCULAR; INTRAVENOUS
Status: CANCELLED | OUTPATIENT
Start: 2025-07-30

## 2025-07-23 RX ORDER — MEPERIDINE HYDROCHLORIDE 50 MG/ML
12.5 INJECTION INTRAMUSCULAR; INTRAVENOUS; SUBCUTANEOUS PRN
OUTPATIENT
Start: 2025-08-05

## 2025-07-23 RX ORDER — FAMOTIDINE 10 MG/ML
20 INJECTION, SOLUTION INTRAVENOUS
Status: CANCELLED | OUTPATIENT
Start: 2025-07-29

## 2025-07-23 RX ORDER — ALBUTEROL SULFATE 90 UG/1
4 INHALANT RESPIRATORY (INHALATION) PRN
Status: CANCELLED | OUTPATIENT
Start: 2025-08-02

## 2025-07-23 RX ORDER — HYDROCORTISONE SODIUM SUCCINATE 100 MG/2ML
100 INJECTION INTRAMUSCULAR; INTRAVENOUS
Status: CANCELLED | OUTPATIENT
Start: 2025-07-30

## 2025-07-23 RX ORDER — SODIUM CHLORIDE 9 MG/ML
INJECTION, SOLUTION INTRAVENOUS CONTINUOUS
Status: CANCELLED | OUTPATIENT
Start: 2025-07-29

## 2025-07-23 RX ORDER — HYDROCORTISONE SODIUM SUCCINATE 100 MG/2ML
100 INJECTION INTRAMUSCULAR; INTRAVENOUS
Status: CANCELLED | OUTPATIENT
Start: 2025-07-31

## 2025-07-23 RX ORDER — EPINEPHRINE 1 MG/ML
0.3 INJECTION, SOLUTION, CONCENTRATE INTRAVENOUS PRN
Status: CANCELLED | OUTPATIENT
Start: 2025-08-01

## 2025-07-23 RX ORDER — ACETAMINOPHEN 325 MG/1
650 TABLET ORAL
Status: CANCELLED | OUTPATIENT
Start: 2025-07-30

## 2025-07-23 RX ORDER — HEPARIN SODIUM (PORCINE) LOCK FLUSH IV SOLN 100 UNIT/ML 100 UNIT/ML
500 SOLUTION INTRAVENOUS PRN
Status: CANCELLED | OUTPATIENT
Start: 2025-07-30

## 2025-07-23 RX ORDER — SODIUM CHLORIDE 9 MG/ML
INJECTION, SOLUTION INTRAVENOUS CONTINUOUS
Status: CANCELLED | OUTPATIENT
Start: 2025-07-30

## 2025-07-23 RX ORDER — FAMOTIDINE 10 MG/ML
20 INJECTION, SOLUTION INTRAVENOUS
OUTPATIENT
Start: 2025-08-05

## 2025-07-23 RX ORDER — ACETAMINOPHEN 325 MG/1
650 TABLET ORAL
Status: CANCELLED | OUTPATIENT
Start: 2025-08-02

## 2025-07-23 RX ORDER — DIPHENHYDRAMINE HYDROCHLORIDE 50 MG/ML
50 INJECTION, SOLUTION INTRAMUSCULAR; INTRAVENOUS
Status: CANCELLED | OUTPATIENT
Start: 2025-08-02

## 2025-07-23 RX ORDER — ACETAMINOPHEN 325 MG/1
650 TABLET ORAL
OUTPATIENT
Start: 2025-08-06

## 2025-07-23 RX ORDER — ONDANSETRON 4 MG/1
8 TABLET, ORALLY DISINTEGRATING ORAL ONCE
Status: CANCELLED | OUTPATIENT
Start: 2025-08-02 | End: 2025-08-02

## 2025-07-23 RX ORDER — MEPERIDINE HYDROCHLORIDE 50 MG/ML
12.5 INJECTION INTRAMUSCULAR; INTRAVENOUS; SUBCUTANEOUS PRN
Status: CANCELLED | OUTPATIENT
Start: 2025-07-31

## 2025-07-23 RX ORDER — ACETAMINOPHEN 325 MG/1
650 TABLET ORAL
Status: CANCELLED | OUTPATIENT
Start: 2025-08-01

## 2025-07-23 RX ORDER — ACETAMINOPHEN 325 MG/1
650 TABLET ORAL
Status: CANCELLED | OUTPATIENT
Start: 2025-07-31

## 2025-07-23 RX ORDER — ONDANSETRON 2 MG/ML
8 INJECTION INTRAMUSCULAR; INTRAVENOUS
Status: CANCELLED | OUTPATIENT
Start: 2025-08-01

## 2025-07-23 RX ORDER — ALBUTEROL SULFATE 90 UG/1
4 INHALANT RESPIRATORY (INHALATION) PRN
Status: CANCELLED | OUTPATIENT
Start: 2025-08-01

## 2025-07-23 RX ORDER — SODIUM CHLORIDE 9 MG/ML
5-250 INJECTION, SOLUTION INTRAVENOUS PRN
OUTPATIENT
Start: 2025-08-05

## 2025-07-23 RX ORDER — DIPHENHYDRAMINE HYDROCHLORIDE 50 MG/ML
50 INJECTION, SOLUTION INTRAMUSCULAR; INTRAVENOUS
Status: CANCELLED | OUTPATIENT
Start: 2025-08-01

## 2025-07-23 RX ORDER — HYDROCORTISONE SODIUM SUCCINATE 100 MG/2ML
100 INJECTION INTRAMUSCULAR; INTRAVENOUS
Status: CANCELLED | OUTPATIENT
Start: 2025-07-29

## 2025-07-23 RX ORDER — SODIUM CHLORIDE 0.9 % (FLUSH) 0.9 %
5-40 SYRINGE (ML) INJECTION PRN
Status: CANCELLED | OUTPATIENT
Start: 2025-07-31

## 2025-07-23 RX ORDER — EPINEPHRINE 1 MG/ML
0.3 INJECTION, SOLUTION, CONCENTRATE INTRAVENOUS PRN
Status: CANCELLED | OUTPATIENT
Start: 2025-08-02

## 2025-07-23 RX ORDER — ONDANSETRON 2 MG/ML
8 INJECTION INTRAMUSCULAR; INTRAVENOUS
OUTPATIENT
Start: 2025-08-06

## 2025-07-23 RX ORDER — DIPHENHYDRAMINE HYDROCHLORIDE 50 MG/ML
50 INJECTION, SOLUTION INTRAMUSCULAR; INTRAVENOUS
Status: CANCELLED | OUTPATIENT
Start: 2025-07-29

## 2025-07-23 RX ORDER — ONDANSETRON 4 MG/1
8 TABLET, ORALLY DISINTEGRATING ORAL ONCE
Status: CANCELLED | OUTPATIENT
Start: 2025-08-01 | End: 2025-08-01

## 2025-07-23 RX ORDER — DIPHENHYDRAMINE HYDROCHLORIDE 50 MG/ML
50 INJECTION, SOLUTION INTRAMUSCULAR; INTRAVENOUS
OUTPATIENT
Start: 2025-08-05

## 2025-07-23 RX ORDER — MEPERIDINE HYDROCHLORIDE 50 MG/ML
12.5 INJECTION INTRAMUSCULAR; INTRAVENOUS; SUBCUTANEOUS PRN
Status: CANCELLED | OUTPATIENT
Start: 2025-07-29

## 2025-07-23 RX ORDER — ONDANSETRON 4 MG/1
8 TABLET, ORALLY DISINTEGRATING ORAL ONCE
Status: CANCELLED | OUTPATIENT
Start: 2025-07-29 | End: 2025-07-29

## 2025-07-23 RX ORDER — FAMOTIDINE 10 MG/ML
20 INJECTION, SOLUTION INTRAVENOUS
Status: CANCELLED | OUTPATIENT
Start: 2025-07-30

## 2025-07-23 RX ORDER — ONDANSETRON 2 MG/ML
8 INJECTION INTRAMUSCULAR; INTRAVENOUS
Status: CANCELLED | OUTPATIENT
Start: 2025-07-30

## 2025-07-23 RX ORDER — ONDANSETRON 4 MG/1
8 TABLET, ORALLY DISINTEGRATING ORAL ONCE
OUTPATIENT
Start: 2025-08-06 | End: 2025-08-06

## 2025-07-23 RX ORDER — SODIUM CHLORIDE 0.9 % (FLUSH) 0.9 %
5-40 SYRINGE (ML) INJECTION PRN
Status: CANCELLED | OUTPATIENT
Start: 2025-07-29

## 2025-07-23 RX ORDER — HYDROCORTISONE SODIUM SUCCINATE 100 MG/2ML
100 INJECTION INTRAMUSCULAR; INTRAVENOUS
OUTPATIENT
Start: 2025-08-05

## 2025-07-23 RX ORDER — ONDANSETRON 2 MG/ML
8 INJECTION INTRAMUSCULAR; INTRAVENOUS
OUTPATIENT
Start: 2025-08-05

## 2025-07-23 RX ORDER — SODIUM CHLORIDE 9 MG/ML
5-250 INJECTION, SOLUTION INTRAVENOUS PRN
Status: CANCELLED | OUTPATIENT
Start: 2025-07-29

## 2025-07-23 RX ORDER — EPINEPHRINE 1 MG/ML
0.3 INJECTION, SOLUTION, CONCENTRATE INTRAVENOUS PRN
OUTPATIENT
Start: 2025-08-06

## 2025-07-23 RX ORDER — MEPERIDINE HYDROCHLORIDE 50 MG/ML
12.5 INJECTION INTRAMUSCULAR; INTRAVENOUS; SUBCUTANEOUS PRN
Status: CANCELLED | OUTPATIENT
Start: 2025-08-02

## 2025-07-23 RX ORDER — FAMOTIDINE 10 MG/ML
20 INJECTION, SOLUTION INTRAVENOUS
Status: CANCELLED | OUTPATIENT
Start: 2025-07-31

## 2025-07-23 RX ORDER — FAMOTIDINE 10 MG/ML
20 INJECTION, SOLUTION INTRAVENOUS
Status: CANCELLED | OUTPATIENT
Start: 2025-08-01

## 2025-07-23 RX ORDER — ACETAMINOPHEN 325 MG/1
650 TABLET ORAL
Status: CANCELLED | OUTPATIENT
Start: 2025-07-29

## 2025-07-23 RX ORDER — MEPERIDINE HYDROCHLORIDE 50 MG/ML
12.5 INJECTION INTRAMUSCULAR; INTRAVENOUS; SUBCUTANEOUS PRN
Status: CANCELLED | OUTPATIENT
Start: 2025-07-30

## 2025-07-23 RX ORDER — EPINEPHRINE 1 MG/ML
0.3 INJECTION, SOLUTION, CONCENTRATE INTRAVENOUS PRN
OUTPATIENT
Start: 2025-08-05

## 2025-07-23 RX ORDER — HEPARIN SODIUM (PORCINE) LOCK FLUSH IV SOLN 100 UNIT/ML 100 UNIT/ML
500 SOLUTION INTRAVENOUS PRN
Status: CANCELLED | OUTPATIENT
Start: 2025-07-31

## 2025-07-23 RX ORDER — ALBUTEROL SULFATE 90 UG/1
4 INHALANT RESPIRATORY (INHALATION) PRN
Status: CANCELLED | OUTPATIENT
Start: 2025-07-29

## 2025-07-23 NOTE — TELEPHONE ENCOUNTER
Tx as planned  Can pt get 8/4 tx at 8 am ,he has to be at [place] for transplant apointment  Rv on 8/25          Rescheduled tx for 8/4/25 @8am  RV on 8/27/25 @1045a due to  not being in the office on 8/25.

## 2025-07-28 ENCOUNTER — HOSPITAL ENCOUNTER (OUTPATIENT)
Dept: INFUSION THERAPY | Age: 60
Discharge: HOME | End: 2025-07-28
Payer: COMMERCIAL

## 2025-07-28 VITALS
HEART RATE: 71 BPM | SYSTOLIC BLOOD PRESSURE: 125 MMHG | WEIGHT: 244.8 LBS | TEMPERATURE: 97.9 F | DIASTOLIC BLOOD PRESSURE: 76 MMHG | BODY MASS INDEX: 30.6 KG/M2 | RESPIRATION RATE: 18 BRPM | OXYGEN SATURATION: 98 %

## 2025-07-28 DIAGNOSIS — D46.9 MDS (MYELODYSPLASTIC SYNDROME) (HCC): Primary | ICD-10-CM

## 2025-07-28 LAB
ALBUMIN SERPL-MCNC: 4.2 G/DL (ref 3.5–5.2)
ALBUMIN/GLOB SERPL: 1.4 {RATIO} (ref 1–2.5)
ALP SERPL-CCNC: 50 U/L (ref 40–129)
ALT SERPL-CCNC: 28 U/L (ref 10–50)
ANION GAP SERPL CALCULATED.3IONS-SCNC: 9 MMOL/L (ref 9–16)
AST SERPL-CCNC: 26 U/L (ref 10–50)
BASOPHILS # BLD: 0 K/UL (ref 0–0.2)
BASOPHILS NFR BLD: 0 % (ref 0–2)
BILIRUB SERPL-MCNC: 0.4 MG/DL (ref 0–1.2)
BUN SERPL-MCNC: 13 MG/DL (ref 8–23)
CALCIUM SERPL-MCNC: 9.3 MG/DL (ref 8.6–10.4)
CHLORIDE SERPL-SCNC: 105 MMOL/L (ref 98–107)
CO2 SERPL-SCNC: 28 MMOL/L (ref 20–31)
CREAT SERPL-MCNC: 0.9 MG/DL (ref 0.7–1.2)
EOSINOPHIL # BLD: 0 K/UL (ref 0–0.4)
EOSINOPHILS RELATIVE PERCENT: 0 % (ref 1–4)
ERYTHROCYTE [DISTWIDTH] IN BLOOD BY AUTOMATED COUNT: 14.9 % (ref 12.5–15.4)
GFR, ESTIMATED: >90 ML/MIN/1.73M2
GLUCOSE SERPL-MCNC: 110 MG/DL (ref 74–99)
HCT VFR BLD AUTO: 33.6 % (ref 41–53)
HGB BLD-MCNC: 10.8 G/DL (ref 13.5–17.5)
LYMPHOCYTES NFR BLD: 0.99 K/UL (ref 1–4.8)
LYMPHOCYTES RELATIVE PERCENT: 58 % (ref 24–44)
MCH RBC QN AUTO: 31.5 PG (ref 26–34)
MCHC RBC AUTO-ENTMCNC: 32.3 G/DL (ref 31–37)
MCV RBC AUTO: 97.6 FL (ref 80–100)
MONOCYTES NFR BLD: 0.05 K/UL (ref 0.1–0.8)
MONOCYTES NFR BLD: 3 % (ref 1–7)
MORPHOLOGY: NORMAL
NEUTROPHILS NFR BLD: 39 % (ref 36–66)
NEUTS SEG NFR BLD: 0.66 K/UL (ref 1.8–7.7)
PLATELET # BLD AUTO: 126 K/UL (ref 140–450)
PMV BLD AUTO: 7.6 FL (ref 6–12)
POTASSIUM SERPL-SCNC: 4.2 MMOL/L (ref 3.7–5.3)
PROT SERPL-MCNC: 7.2 G/DL (ref 6.6–8.7)
RBC # BLD AUTO: 3.44 M/UL (ref 4.5–5.9)
SODIUM SERPL-SCNC: 142 MMOL/L (ref 136–145)
WBC OTHER # BLD: 1.7 K/UL (ref 3.5–11)

## 2025-07-28 PROCEDURE — 85025 COMPLETE CBC W/AUTO DIFF WBC: CPT

## 2025-07-28 PROCEDURE — 96401 CHEMO ANTI-NEOPL SQ/IM: CPT

## 2025-07-28 PROCEDURE — 80053 COMPREHEN METABOLIC PANEL: CPT

## 2025-07-28 PROCEDURE — 2500000003 HC RX 250 WO HCPCS: Performed by: INTERNAL MEDICINE

## 2025-07-28 PROCEDURE — 6360000002 HC RX W HCPCS: Performed by: INTERNAL MEDICINE

## 2025-07-28 PROCEDURE — 36415 COLL VENOUS BLD VENIPUNCTURE: CPT

## 2025-07-28 RX ADMIN — WATER 185 MG: 1 INJECTION INTRAMUSCULAR; INTRAVENOUS; SUBCUTANEOUS at 14:45

## 2025-07-28 NOTE — PROGRESS NOTES
Patient here for C3 D1 - Vidaza injection  Arrives ambulatory  Labs drawn and reviewed.  Denies any new complaints  Injection completed without incident  Discharged in stable condition  Scheduled 7/29/2025 C3 D2 Vidaza

## 2025-07-29 ENCOUNTER — HOSPITAL ENCOUNTER (OUTPATIENT)
Dept: INFUSION THERAPY | Age: 60
Discharge: HOME | End: 2025-07-29
Payer: COMMERCIAL

## 2025-07-29 VITALS
DIASTOLIC BLOOD PRESSURE: 74 MMHG | HEART RATE: 62 BPM | TEMPERATURE: 97.9 F | SYSTOLIC BLOOD PRESSURE: 119 MMHG | RESPIRATION RATE: 18 BRPM

## 2025-07-29 DIAGNOSIS — D46.9 MDS (MYELODYSPLASTIC SYNDROME) (HCC): Primary | ICD-10-CM

## 2025-07-29 PROCEDURE — 2500000003 HC RX 250 WO HCPCS: Performed by: INTERNAL MEDICINE

## 2025-07-29 PROCEDURE — 6360000002 HC RX W HCPCS: Performed by: INTERNAL MEDICINE

## 2025-07-29 PROCEDURE — 96401 CHEMO ANTI-NEOPL SQ/IM: CPT

## 2025-07-29 RX ADMIN — WATER 185 MG: 1 INJECTION INTRAMUSCULAR; INTRAVENOUS; SUBCUTANEOUS at 13:56

## 2025-07-29 NOTE — PROGRESS NOTES
Patient here for C3D2 vidaza  Arrives ambulatory.  Denies any new complaints.  Treatment complete without incident.  Patient discharged in stable condition.  Returns 7/30/25 for C3D3.

## 2025-07-30 ENCOUNTER — HOSPITAL ENCOUNTER (OUTPATIENT)
Dept: INFUSION THERAPY | Age: 60
Discharge: HOME | End: 2025-07-30
Payer: COMMERCIAL

## 2025-07-30 VITALS — HEART RATE: 74 BPM | RESPIRATION RATE: 18 BRPM | SYSTOLIC BLOOD PRESSURE: 112 MMHG | DIASTOLIC BLOOD PRESSURE: 68 MMHG

## 2025-07-30 DIAGNOSIS — D46.9 MDS (MYELODYSPLASTIC SYNDROME) (HCC): Primary | ICD-10-CM

## 2025-07-30 PROCEDURE — 96401 CHEMO ANTI-NEOPL SQ/IM: CPT

## 2025-07-30 PROCEDURE — 6360000002 HC RX W HCPCS: Performed by: INTERNAL MEDICINE

## 2025-07-30 PROCEDURE — 2500000003 HC RX 250 WO HCPCS: Performed by: INTERNAL MEDICINE

## 2025-07-30 RX ADMIN — WATER 185 MG: 1 INJECTION INTRAMUSCULAR; INTRAVENOUS; SUBCUTANEOUS at 13:30

## 2025-07-30 NOTE — PROGRESS NOTES
Patient arrives ambulatory for Vidaza C3D3  Patient denies complaints or concerns  Patient tolerated tx without incident and discharged in stable condition  Next appointment 7/31 for C3D4 (Per pt he will be at [facility] next week and asked that his appointments on 8/4 and 8/5 be canceled)

## 2025-07-31 ENCOUNTER — HOSPITAL ENCOUNTER (OUTPATIENT)
Dept: INFUSION THERAPY | Age: 60
Discharge: HOME | End: 2025-07-31
Payer: COMMERCIAL

## 2025-07-31 VITALS — DIASTOLIC BLOOD PRESSURE: 81 MMHG | RESPIRATION RATE: 18 BRPM | SYSTOLIC BLOOD PRESSURE: 131 MMHG | HEART RATE: 61 BPM

## 2025-07-31 DIAGNOSIS — D46.9 MDS (MYELODYSPLASTIC SYNDROME) (HCC): Primary | ICD-10-CM

## 2025-07-31 PROCEDURE — 6360000002 HC RX W HCPCS: Performed by: INTERNAL MEDICINE

## 2025-07-31 PROCEDURE — 96401 CHEMO ANTI-NEOPL SQ/IM: CPT

## 2025-07-31 PROCEDURE — 2500000003 HC RX 250 WO HCPCS: Performed by: INTERNAL MEDICINE

## 2025-07-31 RX ADMIN — WATER 185 MG: 1 INJECTION INTRAMUSCULAR; INTRAVENOUS; SUBCUTANEOUS at 13:49

## 2025-07-31 NOTE — PROGRESS NOTES
Pt here for C.3 D.4. - Vidanette   Arrives ambulatory.  Denies any new complaints.  Tx complete without incident.  Pt d/c'd in stable condition.  Returns 8/1/2025 for C3 D5 - Vidaza.

## 2025-08-01 ENCOUNTER — HOSPITAL ENCOUNTER (OUTPATIENT)
Dept: INFUSION THERAPY | Age: 60
Discharge: HOME | End: 2025-08-01
Payer: COMMERCIAL

## 2025-08-01 VITALS — SYSTOLIC BLOOD PRESSURE: 124 MMHG | DIASTOLIC BLOOD PRESSURE: 70 MMHG | HEART RATE: 67 BPM

## 2025-08-01 DIAGNOSIS — D46.9 MDS (MYELODYSPLASTIC SYNDROME) (HCC): Primary | ICD-10-CM

## 2025-08-01 PROCEDURE — 6360000002 HC RX W HCPCS: Performed by: INTERNAL MEDICINE

## 2025-08-01 PROCEDURE — 96401 CHEMO ANTI-NEOPL SQ/IM: CPT

## 2025-08-01 PROCEDURE — 2500000003 HC RX 250 WO HCPCS: Performed by: INTERNAL MEDICINE

## 2025-08-01 RX ADMIN — WATER 185 MG: 1 INJECTION INTRAMUSCULAR; INTRAVENOUS; SUBCUTANEOUS at 13:50

## 2025-08-01 NOTE — PROGRESS NOTES
Pt here for C.3D.5.  Arrives ambulatory.  Denies any new complaints.  Tx complete without incident.  Pt d/c'd in stable condition.  Returns 8/27 for MD visit.

## 2025-08-05 ENCOUNTER — TRANSCRIBE ORDERS (OUTPATIENT)
Dept: ADMINISTRATIVE | Age: 60
End: 2025-08-05

## 2025-08-05 DIAGNOSIS — Z13.820 SPECIAL SCREENING FOR OSTEOPOROSIS: ICD-10-CM

## 2025-08-05 DIAGNOSIS — Z01.818 PREOPERATIVE CLEARANCE: Primary | ICD-10-CM

## 2025-08-08 DIAGNOSIS — Z01.818 PREOPERATIVE CLEARANCE: ICD-10-CM

## 2025-08-08 DIAGNOSIS — Z13.820 SPECIAL SCREENING FOR OSTEOPOROSIS: ICD-10-CM

## 2025-08-08 PROCEDURE — 77080 DXA BONE DENSITY AXIAL: CPT

## 2025-09-03 RX ORDER — ROSUVASTATIN CALCIUM 20 MG/1
20 TABLET, COATED ORAL DAILY
Qty: 90 TABLET | Refills: 1 | Status: SHIPPED | OUTPATIENT
Start: 2025-09-03

## (undated) DEVICE — PACK,LAPAROTOMY,NO GOWNS: Brand: MEDLINE

## (undated) DEVICE — NO USE 18 MONTHS GOWN STD LG  1153D

## (undated) DEVICE — DUP USE 393023 JELLY LUBRICATING EZ 2OZ

## (undated) DEVICE — HYBRID TUBING/CAP SET FOR OLYMPUS® SCOPES: Brand: ERBE

## (undated) DEVICE — SYRINGE, LUER LOCK, 10ML: Brand: MEDLINE

## (undated) DEVICE — SHEET, T, LAPAROTOMY, STERILE: Brand: MEDLINE

## (undated) DEVICE — SUTURE VCRL + SZ 3-0 L27IN ABSRB UD L26MM SH 1/2 CIR VCP416H

## (undated) DEVICE — SUTURE VCRL + SZ 2-0 L27IN ABSRB UD CT-2 L26MM 1/2 CIR TAPR VCP269H

## (undated) DEVICE — AIRLIFE™ NASAL OXYGEN CANNULA CURVED, FLARED TIP, WITH 7 FEET (2.1 M) CRUSH RESISTANT TUBING, OVER-THE-EAR STYLE: Brand: AIRLIFE™

## (undated) DEVICE — GAUZE,SPONGE,4"X4",16PLY,XRAY,STRL,LF: Brand: MEDLINE

## (undated) DEVICE — PAD,NON-ADHERENT,3X8,STERILE,LF,1/PK: Brand: MEDLINE

## (undated) DEVICE — SOLUTION IV IRRIG WATER 1000ML POUR BRL 2F7114

## (undated) DEVICE — HYPODERMIC SAFETY NEEDLE: Brand: MAGELLAN

## (undated) DEVICE — GOWN,AURORA,NONREINFORCED,LARGE: Brand: MEDLINE

## (undated) DEVICE — Z CONVERTED USE 2271365 TRAY SKIN W3XL3IN S STL STPL REMV GZ PD DISP MEDI PK

## (undated) DEVICE — Device: Brand: MEDICAL ACTION INDUSTRIES

## (undated) DEVICE — INTENDED FOR TISSUE SEPARATION, AND OTHER PROCEDURES THAT REQUIRE A SHARP SURGICAL BLADE TO PUNCTURE OR CUT.: Brand: BARD-PARKER ® CARBON RIB-BACK BLADES

## (undated) DEVICE — ST CHARLES MINOR ABDOMINAL PK: Brand: MEDLINE INDUSTRIES, INC.

## (undated) DEVICE — DEFENDO AIR WATER SUCTION AND BIOPSY VALVE KIT FOR  OLYMPUS: Brand: DEFENDO AIR/WATER/SUCTION AND BIOPSY VALVE

## (undated) DEVICE — SUTURE ETHLN SZ 3-0 L18IN NONABSORBABLE BLK FS-1 L24MM 3/8 663H

## (undated) DEVICE — 3M™ TEGADERM™ TRANSPARENT FILM DRESSING FRAME STYLE, 1626W, 4 IN X 4-3/4 IN (10 CM X 12 CM), 50/CT 4CT/CASE: Brand: 3M™ TEGADERM™

## (undated) DEVICE — PENCIL ES L3M BTTN SWCH HOLSTER W/ BLDE ELECTRD EDGE

## (undated) DEVICE — TUBING, SUCTION, 3/16" X 10', STRAIGHT: Brand: MEDLINE

## (undated) DEVICE — STANDARD HYPODERMIC NEEDLE,POLYPROPYLENE HUB: Brand: MONOJECT

## (undated) DEVICE — SOLUTION IV IRRIG POUR BRL 0.9% SODIUM CHL 2F7124

## (undated) DEVICE — JELLY,LUBE,STERILE,FLIP TOP,TUBE,2-OZ: Brand: MEDLINE

## (undated) DEVICE — Z DISCONTINUED GLOVE SURG SZ 7.5 L12IN FNGR THK13MIL WHT ISOLEX

## (undated) DEVICE — 3M™ TEGADERM™ TRANSPARENT FILM DRESSING FRAME STYLE, 1624W, 2-3/8 IN X 2-3/4 IN (6 CM X 7 CM), 100/CT 4CT/CASE: Brand: 3M™ TEGADERM™

## (undated) DEVICE — NEEDLE SCLERO 25GA L240CM OD0.51MM ID0.24MM EXTN L4MM SHTH

## (undated) DEVICE — COVER,MAYO STAND,STERILE: Brand: MEDLINE

## (undated) DEVICE — Device